# Patient Record
Sex: FEMALE | Race: WHITE | HISPANIC OR LATINO | Employment: FULL TIME | URBAN - METROPOLITAN AREA
[De-identification: names, ages, dates, MRNs, and addresses within clinical notes are randomized per-mention and may not be internally consistent; named-entity substitution may affect disease eponyms.]

---

## 2017-04-18 ENCOUNTER — ALLSCRIPTS OFFICE VISIT (OUTPATIENT)
Dept: OTHER | Facility: OTHER | Age: 32
End: 2017-04-18

## 2017-09-26 ENCOUNTER — APPOINTMENT (OUTPATIENT)
Dept: LAB | Facility: HOSPITAL | Age: 32
End: 2017-09-26
Payer: COMMERCIAL

## 2017-09-26 ENCOUNTER — ALLSCRIPTS OFFICE VISIT (OUTPATIENT)
Dept: OTHER | Facility: OTHER | Age: 32
End: 2017-09-26

## 2017-09-26 ENCOUNTER — GENERIC CONVERSION - ENCOUNTER (OUTPATIENT)
Dept: OTHER | Facility: OTHER | Age: 32
End: 2017-09-26

## 2017-09-26 ENCOUNTER — TRANSCRIBE ORDERS (OUTPATIENT)
Dept: LAB | Facility: HOSPITAL | Age: 32
End: 2017-09-26

## 2017-09-26 DIAGNOSIS — Z34.91 ENCOUNTER FOR SUPERVISION OF NORMAL PREGNANCY IN FIRST TRIMESTER: ICD-10-CM

## 2017-09-26 LAB
ABO GROUP BLD: NORMAL
BASOPHILS # BLD AUTO: 0.01 THOUSANDS/ΜL (ref 0–0.1)
BASOPHILS NFR BLD AUTO: 0 % (ref 0–1)
BILIRUB UR QL STRIP: NEGATIVE
BLD GP AB SCN SERPL QL: NEGATIVE
CLARITY UR: CLEAR
COLOR UR: YELLOW
EOSINOPHIL # BLD AUTO: 0.28 THOUSAND/ΜL (ref 0–0.61)
EOSINOPHIL NFR BLD AUTO: 4 % (ref 0–6)
ERYTHROCYTE [DISTWIDTH] IN BLOOD BY AUTOMATED COUNT: 13.2 % (ref 11.6–15.1)
GLUCOSE UR STRIP-MCNC: NEGATIVE MG/DL
HBV SURFACE AG SER QL: NORMAL
HCT VFR BLD AUTO: 34.9 % (ref 34.8–46.1)
HGB BLD-MCNC: 11.9 G/DL (ref 11.5–15.4)
HGB UR QL STRIP.AUTO: NEGATIVE
KETONES UR STRIP-MCNC: NEGATIVE MG/DL
LEUKOCYTE ESTERASE UR QL STRIP: NEGATIVE
LYMPHOCYTES # BLD AUTO: 2.09 THOUSANDS/ΜL (ref 0.6–4.47)
LYMPHOCYTES NFR BLD AUTO: 30 % (ref 14–44)
MCH RBC QN AUTO: 29.1 PG (ref 26.8–34.3)
MCHC RBC AUTO-ENTMCNC: 34.1 G/DL (ref 31.4–37.4)
MCV RBC AUTO: 85 FL (ref 82–98)
MONOCYTES # BLD AUTO: 0.55 THOUSAND/ΜL (ref 0.17–1.22)
MONOCYTES NFR BLD AUTO: 8 % (ref 4–12)
NEUTROPHILS # BLD AUTO: 3.98 THOUSANDS/ΜL (ref 1.85–7.62)
NEUTS SEG NFR BLD AUTO: 58 % (ref 43–75)
NITRITE UR QL STRIP: NEGATIVE
NRBC BLD AUTO-RTO: 0 /100 WBCS
PH UR STRIP.AUTO: 6 [PH] (ref 4.5–8)
PLATELET # BLD AUTO: 196 THOUSANDS/UL (ref 149–390)
PMV BLD AUTO: 11.5 FL (ref 8.9–12.7)
PROT UR STRIP-MCNC: NEGATIVE MG/DL
RBC # BLD AUTO: 4.09 MILLION/UL (ref 3.81–5.12)
RH BLD: NEGATIVE
RUBV IGG SERPL IA-ACNC: 41.5 IU/ML
SP GR UR STRIP.AUTO: 1.01 (ref 1–1.03)
SPECIMEN EXPIRATION DATE: NORMAL
UROBILINOGEN UR QL STRIP.AUTO: 1 E.U./DL
WBC # BLD AUTO: 6.93 THOUSAND/UL (ref 4.31–10.16)

## 2017-09-26 PROCEDURE — 87086 URINE CULTURE/COLONY COUNT: CPT

## 2017-09-26 PROCEDURE — 80081 OBSTETRIC PANEL INC HIV TSTG: CPT

## 2017-09-26 PROCEDURE — 81003 URINALYSIS AUTO W/O SCOPE: CPT

## 2017-09-26 PROCEDURE — 36415 COLL VENOUS BLD VENIPUNCTURE: CPT

## 2017-09-27 LAB
BACTERIA UR CULT: NORMAL
HIV 1+2 AB+HIV1 P24 AG SERPL QL IA: NORMAL
RPR SER QL: NORMAL

## 2017-10-04 ENCOUNTER — LAB REQUISITION (OUTPATIENT)
Dept: LAB | Facility: HOSPITAL | Age: 32
End: 2017-10-04
Payer: COMMERCIAL

## 2017-10-04 ENCOUNTER — GENERIC CONVERSION - ENCOUNTER (OUTPATIENT)
Dept: OTHER | Facility: OTHER | Age: 32
End: 2017-10-04

## 2017-10-04 ENCOUNTER — ALLSCRIPTS OFFICE VISIT (OUTPATIENT)
Dept: OTHER | Facility: OTHER | Age: 32
End: 2017-10-04

## 2017-10-04 DIAGNOSIS — Z01.419 ENCOUNTER FOR GYNECOLOGICAL EXAMINATION WITHOUT ABNORMAL FINDING: ICD-10-CM

## 2017-10-04 DIAGNOSIS — Z11.3 ENCOUNTER FOR SCREENING FOR INFECTIONS WITH PREDOMINANTLY SEXUAL MODE OF TRANSMISSION: ICD-10-CM

## 2017-10-04 PROCEDURE — G0145 SCR C/V CYTO,THINLAYER,RESCR: HCPCS | Performed by: OBSTETRICS & GYNECOLOGY

## 2017-10-04 PROCEDURE — 87591 N.GONORRHOEAE DNA AMP PROB: CPT | Performed by: OBSTETRICS & GYNECOLOGY

## 2017-10-04 PROCEDURE — 87491 CHLMYD TRACH DNA AMP PROBE: CPT | Performed by: OBSTETRICS & GYNECOLOGY

## 2017-10-04 PROCEDURE — 87624 HPV HI-RISK TYP POOLED RSLT: CPT | Performed by: OBSTETRICS & GYNECOLOGY

## 2017-10-06 LAB
CHLAMYDIA DNA CVX QL NAA+PROBE: NORMAL
HPV RRNA GENITAL QL NAA+PROBE: NORMAL
N GONORRHOEA DNA GENITAL QL NAA+PROBE: NORMAL

## 2017-10-18 LAB
LAB AP GYN PRIMARY INTERPRETATION: NORMAL
Lab: NORMAL

## 2017-11-01 ENCOUNTER — GENERIC CONVERSION - ENCOUNTER (OUTPATIENT)
Dept: OTHER | Facility: OTHER | Age: 32
End: 2017-11-01

## 2017-11-01 ENCOUNTER — TRANSCRIBE ORDERS (OUTPATIENT)
Dept: LAB | Facility: HOSPITAL | Age: 32
End: 2017-11-01

## 2017-11-01 DIAGNOSIS — Z34.91 ENCOUNTER FOR SUPERVISION OF NORMAL PREGNANCY IN FIRST TRIMESTER, UNSPECIFIED GRAVIDITY: Primary | ICD-10-CM

## 2017-11-03 DIAGNOSIS — Z34.91 ENCOUNTER FOR SUPERVISION OF NORMAL PREGNANCY IN FIRST TRIMESTER: ICD-10-CM

## 2017-11-20 ENCOUNTER — APPOINTMENT (OUTPATIENT)
Dept: LAB | Facility: HOSPITAL | Age: 32
End: 2017-11-20
Payer: COMMERCIAL

## 2017-11-20 DIAGNOSIS — Z34.91 ENCOUNTER FOR SUPERVISION OF NORMAL PREGNANCY IN FIRST TRIMESTER, UNSPECIFIED GRAVIDITY: ICD-10-CM

## 2017-11-20 PROCEDURE — 82105 ALPHA-FETOPROTEIN SERUM: CPT

## 2017-11-20 PROCEDURE — 36415 COLL VENOUS BLD VENIPUNCTURE: CPT

## 2017-11-20 PROCEDURE — 84702 CHORIONIC GONADOTROPIN TEST: CPT

## 2017-11-20 PROCEDURE — 82677 ASSAY OF ESTRIOL: CPT

## 2017-11-20 PROCEDURE — 86336 INHIBIN A: CPT

## 2017-11-27 ENCOUNTER — GENERIC CONVERSION - ENCOUNTER (OUTPATIENT)
Dept: OTHER | Facility: OTHER | Age: 32
End: 2017-11-27

## 2017-11-27 ENCOUNTER — ALLSCRIPTS OFFICE VISIT (OUTPATIENT)
Dept: OTHER | Facility: OTHER | Age: 32
End: 2017-11-27

## 2017-11-27 LAB
GLUCOSE (HISTORICAL): NORMAL
PROT UR STRIP-MCNC: NORMAL MG/DL

## 2017-12-13 ENCOUNTER — GENERIC CONVERSION - ENCOUNTER (OUTPATIENT)
Dept: OTHER | Facility: OTHER | Age: 32
End: 2017-12-13

## 2017-12-13 ENCOUNTER — ALLSCRIPTS OFFICE VISIT (OUTPATIENT)
Dept: PERINATAL CARE | Facility: CLINIC | Age: 32
End: 2017-12-13
Payer: COMMERCIAL

## 2017-12-13 PROCEDURE — 76811 OB US DETAILED SNGL FETUS: CPT | Performed by: OBSTETRICS & GYNECOLOGY

## 2017-12-13 PROCEDURE — 76817 TRANSVAGINAL US OBSTETRIC: CPT | Performed by: OBSTETRICS & GYNECOLOGY

## 2017-12-29 ENCOUNTER — GENERIC CONVERSION - ENCOUNTER (OUTPATIENT)
Dept: OTHER | Facility: OTHER | Age: 32
End: 2017-12-29

## 2017-12-29 DIAGNOSIS — Z34.92 ENCOUNTER FOR SUPERVISION OF NORMAL PREGNANCY IN SECOND TRIMESTER: ICD-10-CM

## 2018-01-14 VITALS
WEIGHT: 137 LBS | SYSTOLIC BLOOD PRESSURE: 121 MMHG | BODY MASS INDEX: 23.39 KG/M2 | HEIGHT: 64 IN | DIASTOLIC BLOOD PRESSURE: 85 MMHG

## 2018-01-16 ENCOUNTER — TRANSCRIBE ORDERS (OUTPATIENT)
Dept: LAB | Facility: HOSPITAL | Age: 33
End: 2018-01-16

## 2018-01-16 ENCOUNTER — APPOINTMENT (OUTPATIENT)
Dept: LAB | Facility: HOSPITAL | Age: 33
End: 2018-01-16
Payer: COMMERCIAL

## 2018-01-16 DIAGNOSIS — Z34.92 ENCOUNTER FOR SUPERVISION OF NORMAL PREGNANCY IN SECOND TRIMESTER: ICD-10-CM

## 2018-01-16 LAB
ABO GROUP BLD: NORMAL
BLD GP AB SCN SERPL QL: NEGATIVE
ERYTHROCYTE [DISTWIDTH] IN BLOOD BY AUTOMATED COUNT: 13.1 % (ref 11.6–15.1)
GLUCOSE 1H P 50 G GLC PO SERPL-MCNC: 78 MG/DL
HCT VFR BLD AUTO: 33.1 % (ref 34.8–46.1)
HGB BLD-MCNC: 11.4 G/DL (ref 11.5–15.4)
MCH RBC QN AUTO: 30 PG (ref 26.8–34.3)
MCHC RBC AUTO-ENTMCNC: 34.4 G/DL (ref 31.4–37.4)
MCV RBC AUTO: 87 FL (ref 82–98)
PLATELET # BLD AUTO: 201 THOUSANDS/UL (ref 149–390)
PMV BLD AUTO: 10.9 FL (ref 8.9–12.7)
RBC # BLD AUTO: 3.8 MILLION/UL (ref 3.81–5.12)
RH BLD: NEGATIVE
RPR SER QL: NORMAL
SPECIMEN EXPIRATION DATE: NORMAL
WBC # BLD AUTO: 10.69 THOUSAND/UL (ref 4.31–10.16)

## 2018-01-16 PROCEDURE — 86850 RBC ANTIBODY SCREEN: CPT

## 2018-01-16 PROCEDURE — 86901 BLOOD TYPING SEROLOGIC RH(D): CPT

## 2018-01-16 PROCEDURE — 85027 COMPLETE CBC AUTOMATED: CPT

## 2018-01-16 PROCEDURE — 86592 SYPHILIS TEST NON-TREP QUAL: CPT

## 2018-01-16 PROCEDURE — 86900 BLOOD TYPING SEROLOGIC ABO: CPT

## 2018-01-16 PROCEDURE — 36415 COLL VENOUS BLD VENIPUNCTURE: CPT

## 2018-01-16 PROCEDURE — 82950 GLUCOSE TEST: CPT

## 2018-01-22 VITALS
HEIGHT: 64 IN | WEIGHT: 141 LBS | DIASTOLIC BLOOD PRESSURE: 77 MMHG | BODY MASS INDEX: 24.07 KG/M2 | SYSTOLIC BLOOD PRESSURE: 124 MMHG

## 2018-01-22 VITALS
BODY MASS INDEX: 24.41 KG/M2 | HEIGHT: 64 IN | SYSTOLIC BLOOD PRESSURE: 128 MMHG | DIASTOLIC BLOOD PRESSURE: 74 MMHG | WEIGHT: 143 LBS

## 2018-01-22 VITALS
WEIGHT: 142 LBS | HEIGHT: 64 IN | BODY MASS INDEX: 24.24 KG/M2 | SYSTOLIC BLOOD PRESSURE: 126 MMHG | DIASTOLIC BLOOD PRESSURE: 75 MMHG

## 2018-01-22 VITALS
SYSTOLIC BLOOD PRESSURE: 110 MMHG | DIASTOLIC BLOOD PRESSURE: 70 MMHG | BODY MASS INDEX: 24.89 KG/M2 | WEIGHT: 145 LBS | RESPIRATION RATE: 81 BRPM

## 2018-01-23 NOTE — MISCELLANEOUS
Reason For Visit  Reason For Visit Free Text Note Form: PN PATIENT SEEN FOR ASSESS  Case Management Documentation [facility]:   Information obtained from the patient and medical record  Patient's financial status employed  She is also dealing with additional issues such as language/communication barrier  Patient is participating in Sophie Cabrera  Action Plan: information provided  Progress Note  SW MET WITH 31 Y/O- S- G3:P2-  Telugu SPEAKING WOMAN  PATIENT RESIDES WITH FOB AND 2 KIDS  REPORTED PREGNANCY WAS PLAN  PATIENT DENIES ANY USAGE OF DRUG,ALCOHOL OR SMOKING  NO MENTAL HEALTH OR DOMESTIC VIOLENCE ISSUES  PATIENT NEED TO CALL WIC FOR APPOINTMENT  HAS COMMERCIAL INSURANCE  NO CONCERN AT THIS TIME  WILL CONTACT SW AS NEEDED  Active Problems    1  Prenatal care in second trimester (V22 1) (Z34 92)   2  Varicose veins of legs (454 9) (I69 93)    Current Meds   1  Iron TABS; Therapy: (Recorded:2017) to Recorded   2  Prenatal Plus 27-1 MG Oral Tablet; TAKE 1 TABLET DAILY; Therapy: [id number] to (Evaluate:2018) Recorded    Allergies    1  No Known Drug Allergies    2   Seasonal    Future Appointments    Date/Time Provider Specialty Site   2018 10:00 AM  Rakesh, Srini  [facility]     Signatures   Electronically signed by : JUDAH Leonard; Dec 29 2017 11:09AM EST                       (Author)

## 2018-01-23 NOTE — PROGRESS NOTES
DEC 13 2017         RE: Luna Ramires                                 To: [address]   MR#: [id number]                                   [facility]   : [address] [facility], [address]   ENC: [id number]                             Fax: [phone]   (Exam #: [id number])      The LMP of this 28year old,  G3, P2-0-0-2 patient was 2017, her   working TANVI is 2018 and the current gestational age is 21 weeks 5   days by [address] [place] [address]  A sonographic examination was performed on DEC   13 2017 using real time equipment  The ultrasound examination was   performed using abdominal & vaginal techniques  The patient has a BMI of   25 9  Her blood pressure today was 116/64  Earliest US on record: 10/4/17 11w5d 18       TANVI      Cardiac motion was observed at 148 bpm       INDICATIONS      fetal anatomical survey   rh negative      Exam Types      Transvaginal   LEVEL II      RESULTS      Fetus # 1 of 1   Breech presentation   Fetal growth appeared normal   Placenta Location = Anterior   No placenta previa   Placenta Grade = I      MEASUREMENTS (* Included In Average GA)      AC              16 4 cm        21 weeks 1 day * (35%)   BPD              5 2 cm        21 weeks 6 days* (49%)   HC              18 9 cm        21 weeks 1 day * (25%)   Femur            3 5 cm        21 weeks 1 day * (27%)      Nuchal Fold      3 5 mm   NBL              6 8 mm      Humerus          3 5 cm        22 weeks 0 days  (56%)      Cerebellum       2 2 cm        21 weeks 1 day   Biorbit          3 4 cm        21 weeks 6 days   CisternaMagna    4 8 mm      HC/AC           1 15   FL/AC           0 21   FL/BPD          0 67   EFW (Ac/Fl/Hc)   408 grams - 0 lbs 14 oz      THE AVERAGE GESTATIONAL AGE is 21 weeks 2 days +/- 10 days        AMNIOTIC FLUID         Largest Vertical Pocket = 4 9 cm   Amniotic Fluid: Normal      CERVICAL EVALUATION SUPINE      Cervical Length: 3 60 cm      OTHER TEST RESULTS           Funneling?: No             Dynamic Changes?: No        Resp  To TFP?: No      ANATOMY      Head                                    Normal   Face/Neck                               Normal   Th  Cav  Normal   Heart                                   See Details   Abd  Cav  Normal   Stomach                                 Normal   Right Kidney                            Normal   Left Kidney                             Normal   Bladder                                 Normal   Abd  Wall                               Normal   Spine                                   Normal   Extrems                                 Normal   Genitalia                               Normal   Placenta                                Normal   Umbl  Cord                              Normal   Uterus                                  Normal   PCI                                     Normal      ANATOMY DETAILS      Visualized Appearing Sonographically Normal:   HEAD: (Calvarium, BPD Level, Cavum, Lateral Ventricles, Choroid Plexus,   Cerebellum, Cisterna Magna);    FACE/NECK: (Neck, Nuchal Fold, Profile,   Orbits, Nose/Lips, Palate, Face);    TH  CAV  : (Lungs, Diaphragm); HEART: (Four Chamber View, Proximal Left Outflow, Proximal Right Outflow,   3VV, 3 Vessel Trachea, Short Axis of Greater Vessels, Ductal Arch,   Interventricular Septum, Interatrial Septum, Cardiac Axis, Cardiac   Position);    ABD  CAV : (Liver);    STOMACH, RIGHT KIDNEY, LEFT KIDNEY,   BLADDER, ABD  WALL, SPINE: (Cervical Spine, Thoracic Spine, Lumbar Spine,   Sacrum);    EXTREMS: (Lt Humerus, Rt Humerus, Lt Forearm, Rt Forearm, Lt   Hand, Rt Hand, Lt Femur, Rt Femur, Lt Low Leg, Rt Low Leg, Lt Foot, Rt   Foot);    GENITALIA, PLACENTA, UMBL   CORD, UTERUS, PCI      Not Visualized:   HEART: (Aortic Arch, IVC, SVC)      ADNEXA      The left ovary appeared normal and measured 2 7 x 1 9 x 1 8 cm with a   volume of 4 8 cc  The right ovary appeared normal and measured 3 0 x 2 8 x   1 8 cm with a volume of 7 9 cc  IMPRESSION      Valdez IUP   21 weeks and 2 days by this ultrasound  (TANVI=2018)   21 weeks and 5 days by 1st Tri Sono  (TANVI=2018)   Breech presentation   Fetal growth appeared normal   Regular fetal heart rate of 148 bpm   Anterior placenta   No placenta previa      GENERAL COMMENT      Ms Conner Adair is here for anatomy survey and cervical length screening  She   has a prior  section followed by successful   Quad Screen   results are interpreted as Screen Negative based on risk of Down syndrome   of 1:10,000, risk of trisomy 18 of 1:1,1730, and risk of open neural tube   defect of 1:2155  There is a single live intrauterine pregnancy with size equivalent to   dates  No gross anomalies were identified however the anatomy survey is   incomplete  Amniotic fluid is within normal limits  Transvaginal cervical length measures 36mm indicating low risk for   spontaneous  birth  Evaluation and Management:   The patient was counseled regarding the above findings  A total of 10   minutes were spent in this encounter with >50% of the time spent in   face-to-face counseling and in coordination of care  The limitations of   ultrasound were reviewed  We discussed that while the presence of a normal appearing ultrasound is   reassuring, it does not completely preclude the presence of aneuploidy or   malformation  Quad screen results were reviewed with her in detail  She should return in 8 weeks for followup anatomy survey and growth  We discussed her history of prior  section    We reviewed her   chance of a successful TOLAC (trial of labor after  section) which   per the Jose Corbin 12 (available at [email or web address])   is estimated at 90 2% based on today's BMI factors  We discussed that the   best chance for successful TOLAC is in the setting of spontaneous labor   prior to 40 weeks  I gave her a Patient Education handout from [employer] on    (Vaginal Birth After , in [place]  She also desires   sterilization  At the conclusion of today's encounter, all questions were answered to her   satisfaction  Thank you very much for this kind referral and please do   not hesitate to contact me with any further questions or concerns  [employer], JANINE Zhou     Maternal Fetal Medicine   Electronically signed 17 12:59

## 2018-01-24 ENCOUNTER — OFFICE VISIT (OUTPATIENT)
Dept: OBGYN CLINIC | Facility: HOSPITAL | Age: 33
End: 2018-01-24

## 2018-01-24 ENCOUNTER — OB ABSTRACT (OUTPATIENT)
Dept: OBGYN CLINIC | Facility: HOSPITAL | Age: 33
End: 2018-01-24

## 2018-01-24 VITALS
SYSTOLIC BLOOD PRESSURE: 113 MMHG | BODY MASS INDEX: 27.12 KG/M2 | HEART RATE: 79 BPM | WEIGHT: 158 LBS | DIASTOLIC BLOOD PRESSURE: 71 MMHG

## 2018-01-24 VITALS
BODY MASS INDEX: 25.78 KG/M2 | HEIGHT: 64 IN | DIASTOLIC BLOOD PRESSURE: 64 MMHG | SYSTOLIC BLOOD PRESSURE: 116 MMHG | WEIGHT: 151 LBS

## 2018-01-24 VITALS
SYSTOLIC BLOOD PRESSURE: 114 MMHG | HEIGHT: 64 IN | DIASTOLIC BLOOD PRESSURE: 59 MMHG | BODY MASS INDEX: 25.95 KG/M2 | WEIGHT: 152 LBS | HEART RATE: 77 BPM

## 2018-01-24 DIAGNOSIS — Z3A.27 27 WEEKS GESTATION OF PREGNANCY: ICD-10-CM

## 2018-01-24 DIAGNOSIS — O26.892 RH NEGATIVE STATE IN ANTEPARTUM PERIOD, SECOND TRIMESTER: Primary | ICD-10-CM

## 2018-01-24 DIAGNOSIS — Z67.91 RH NEGATIVE STATE IN ANTEPARTUM PERIOD, SECOND TRIMESTER: Primary | ICD-10-CM

## 2018-01-24 PROBLEM — Z34.92 PRENATAL CARE IN SECOND TRIMESTER: Status: ACTIVE | Noted: 2018-01-24

## 2018-01-24 LAB — EXTERNAL RHOGAM GIVEN?: NORMAL

## 2018-01-24 PROCEDURE — PNV: Performed by: NURSE PRACTITIONER

## 2018-01-24 RX ORDER — PNV NO.95/FERROUS FUM/FOLIC AC 28MG-0.8MG
TABLET ORAL
COMMUNITY
End: 2018-04-26 | Stop reason: HOSPADM

## 2018-01-24 NOTE — PROGRESS NOTES
Denies loss of fluid, vaginal bleeding and contractions  Confirms frequent fetal movement  Tolerating iron and prenatal vitamin well  Twenty-eight week labs reviewed and within normal limits  Due for RhoGAM today  Blood type is O-     plan:  1  Continue prenatal vitamin daily  2    RhoGAM today  3    Precautions reviewed    RTO 2 weeks

## 2018-01-24 NOTE — PATIENT INSTRUCTIONS
Labor   AMBULATORY CARE:    labor  labor occurs when the uterus contracts and your cervix opens earlier than normal  The cervix is the opening of your uterus  In  labor, contractions are strong enough and occur often enough to allow the cervix to open for delivery of your baby   labor happens after the 20th week of pregnancy but before the 37th week of pregnancy  An early labor could cause you to have your baby before he is ready to be born  Common signs and symptoms include the following: You may not know that you are having  labor  It is common to have  contractions (tightening and relaxing of the uterus) and not notice them  The following are signs and symptoms that suggest a  labor:  · Contractions that get stronger and closer together    · Changes in vaginal discharge, such as more discharge or discharge that is watery or bloody     · Low back pain     · Pressure in the lower abdomen     · Vaginal spotting or bleeding  Call 911 for any of the following:   · You see or feel like there is something in your vagina  Seek care immediately if:   · You have bright red, painless vaginal bleeding  · Your symptoms do not get better or they get worse  · Your water broke or you feel warm water gushing or trickling from your vagina  · You have contractions that get stronger and closer together for more than 1 hour  Contact your healthcare provider if:   · You notice a decrease in your baby's movement  · You have abdominal cramps, pressure, or tightening  · You have a change in vaginal discharge  · You have a fever  · You have burning when you urinate or you are urinating less than is normal for you  · You have questions or concerns about your condition or care  Treatment for  labor  may delay delivery  You may need any of the following:  · Bed rest  may be recommended   You may need to lie on your left side, which improves circulation to the uterus and baby  Your healthcare provider will tell you when it is okay to get out of bed  · Medicine  may be given to stop contractions if your baby is not ready to be born  You may also need certain medicines if your  labor cannot be stopped and your healthcare provider thinks you will have your baby early  These medicines help your baby's lungs, brain, and digestive organs mature  They also help decrease your baby's risk of being born with cerebral palsy  Antibiotics may be given to treat a bacterial infection, if needed  Self-care:   · Rest  as much as possible  You may need to lie on your left side to improve circulation to the uterus and baby  You may be able to prevent  labor by resting and reducing your physical activity  · Ask your healthcare provider about activities that are safe for you to do  Your healthcare provider or obstetrician may recommend that you avoid sexual intercourse  Ask your healthcare provider if exercise is safe  · Drink liquids as directed  Ask how much liquid to drink each day and which liquids are best for you  · Do not smoke  Your baby may not grow well and he may weigh less at birth if you smoke during pregnancy  Smoking also increases the risk that your baby will be born too early  Nicotine and other chemicals in cigarettes and cigars can cause lung damage  Ask your healthcare provider for information if you currently smoke and need help to quit  E-cigarettes or smokeless tobacco still contain nicotine  Talk to your healthcare provider before you use these products  · Do not drink alcohol  Alcohol may harm your unborn baby and cause  labor  · Maintain a healthy weight  A healthy weight may prevent  labor  Ask your healthcare provider how much weight you should gain during your pregnancy  Follow up with your healthcare provider as directed:  Write down your questions so you remember to ask them during your visits     © 2017 [facility] Information is for End User's use only and may not be sold, redistributed or otherwise used for commercial purposes  All illustrations and images included in CareNotes® are the copyrighted property of A D A M , Inc  or [address]  The above information is an  only  It is not intended as medical advice for individual conditions or treatments  Talk to your doctor, nurse or pharmacist before following any medical regimen to see if it is safe and effective for you

## 2018-02-09 ENCOUNTER — ULTRASOUND (OUTPATIENT)
Dept: PERINATAL CARE | Facility: CLINIC | Age: 33
End: 2018-02-09
Payer: COMMERCIAL

## 2018-02-09 VITALS
WEIGHT: 157.6 LBS | DIASTOLIC BLOOD PRESSURE: 63 MMHG | HEART RATE: 76 BPM | SYSTOLIC BLOOD PRESSURE: 107 MMHG | HEIGHT: 64 IN | BODY MASS INDEX: 26.91 KG/M2

## 2018-02-09 DIAGNOSIS — O34.219 MATERNAL CARE FOR SCAR FROM PREVIOUS CESAREAN DELIVERY, UNSPECIFIED PRIOR CESAREAN DELIVERY TYPE: ICD-10-CM

## 2018-02-09 DIAGNOSIS — Z36.88 ENCOUNTER FOR ANTENATAL SCREENING FOR FETAL MACROSOMIA: ICD-10-CM

## 2018-02-09 DIAGNOSIS — IMO0002 EVALUATE ANATOMY NOT SEEN ON PRIOR SONOGRAM: Primary | ICD-10-CM

## 2018-02-09 PROCEDURE — 76816 OB US FOLLOW-UP PER FETUS: CPT | Performed by: OBSTETRICS & GYNECOLOGY

## 2018-02-09 NOTE — PROGRESS NOTES
Maternal-Fetal Medicine: Ms Abby Levin was seen today in the [address] today for followup missed anatomy ultrasound  Please see ultrasound report under "OB Procedures" tab in EPIC    Thank you for the referral and please don't hesitate to contact our office with any concerns or questions      Sincerely,  Ronell Seip, MD  Attending Physician, Maternal-Fetal Medicine

## 2018-02-19 ENCOUNTER — ROUTINE PRENATAL (OUTPATIENT)
Dept: OBGYN CLINIC | Facility: HOSPITAL | Age: 33
End: 2018-02-19

## 2018-02-19 VITALS — SYSTOLIC BLOOD PRESSURE: 108 MMHG | DIASTOLIC BLOOD PRESSURE: 70 MMHG | BODY MASS INDEX: 27.57 KG/M2 | WEIGHT: 160.6 LBS

## 2018-02-19 DIAGNOSIS — I83.93 VARICOSE VEINS OF LEGS: ICD-10-CM

## 2018-02-19 DIAGNOSIS — Z3A.31 31 WEEKS GESTATION OF PREGNANCY: Primary | ICD-10-CM

## 2018-02-19 DIAGNOSIS — Z23 NEED FOR IMMUNIZATION AGAINST PERTUSSIS: ICD-10-CM

## 2018-02-19 PROBLEM — Z98.891 HISTORY OF LOW TRANSVERSE CESAREAN SECTION: Status: ACTIVE | Noted: 2018-02-19

## 2018-02-19 PROBLEM — Z34.93 PRENATAL CARE IN THIRD TRIMESTER: Status: ACTIVE | Noted: 2018-01-24

## 2018-02-19 PROCEDURE — PNV: Performed by: OBSTETRICS & GYNECOLOGY

## 2018-02-19 NOTE — PROGRESS NOTES
27 yo  at 31w3d  Patient doing well- has complaints of worsening varicosities in her legs that have now presented on her labia  On exam both legs show moderate varicosities to level upper thigh, soft, non-tender, no edeme, calves equal in size bilaterally, negative homans sign  No SOB, CP or palpitations  Patient states she works long hours on her legs at [employer]  She denies any CTX, Vb, LOF and reports good fetal movement      A/P:  27 yo  at [address] for routine prenatal visit  - Check in card given today   - Please give Tdap at next visit- left before receiving  - Discussed  labor precautions, kick counts and how to get a holdof OB on call  - RTC in 2 weeks    LE varicosities  - Discussed use of compression socks and taking frequent breaks at work to sit  - Work letter for clinic given    H/o CS x1 followed by successful   - Desires vaginal delivery    Rh negative status   - s/p Rhogam at 28 weeks    D/w Dr Araceli Aguillon

## 2018-03-07 NOTE — PROGRESS NOTES
Education  Baby & Me Education 1st Trimester:   First Trimester Education provided:   benefits of breastfeeding, importance of exclusive breastfeeding, early initiation of breastfeeding and exclusive breastfeeding for the first 6 months   The patient is planning on breastfeeding     Prenatal education provided by: Wright Schilder RN      Signatures   Electronically signed by : Wright Schilder, ; Sep 26 2017  1:22PM EST                       (Author)

## 2018-03-23 ENCOUNTER — ROUTINE PRENATAL (OUTPATIENT)
Dept: OBGYN CLINIC | Facility: HOSPITAL | Age: 33
End: 2018-03-23
Payer: COMMERCIAL

## 2018-03-23 VITALS
HEIGHT: 64 IN | WEIGHT: 167 LBS | HEART RATE: 82 BPM | BODY MASS INDEX: 28.51 KG/M2 | DIASTOLIC BLOOD PRESSURE: 77 MMHG | SYSTOLIC BLOOD PRESSURE: 120 MMHG

## 2018-03-23 DIAGNOSIS — Z23 NEED FOR DTAP VACCINE: Primary | ICD-10-CM

## 2018-03-23 DIAGNOSIS — Z67.91 RH NEGATIVE STATE IN ANTEPARTUM PERIOD, THIRD TRIMESTER: ICD-10-CM

## 2018-03-23 DIAGNOSIS — Z34.93 PRENATAL CARE IN THIRD TRIMESTER: ICD-10-CM

## 2018-03-23 DIAGNOSIS — Z23 NEED FOR TDAP VACCINATION: ICD-10-CM

## 2018-03-23 DIAGNOSIS — Z98.891 HISTORY OF LOW TRANSVERSE CESAREAN SECTION: ICD-10-CM

## 2018-03-23 DIAGNOSIS — O26.893 RH NEGATIVE STATE IN ANTEPARTUM PERIOD, THIRD TRIMESTER: ICD-10-CM

## 2018-03-23 DIAGNOSIS — Z3A.36 36 WEEKS GESTATION OF PREGNANCY: ICD-10-CM

## 2018-03-23 PROCEDURE — 90471 IMMUNIZATION ADMIN: CPT | Performed by: OBSTETRICS & GYNECOLOGY

## 2018-03-23 PROCEDURE — PNV: Performed by: OBSTETRICS & GYNECOLOGY

## 2018-03-23 PROCEDURE — 90715 TDAP VACCINE 7 YRS/> IM: CPT | Performed by: OBSTETRICS & GYNECOLOGY

## 2018-03-23 PROCEDURE — 87653 STREP B DNA AMP PROBE: CPT | Performed by: OBSTETRICS & GYNECOLOGY

## 2018-03-23 NOTE — ASSESSMENT & PLAN NOTE
Continue to elevate legs and utilize compression socks  May apply ice packs to vaginal area to relieve pain and discomfort

## 2018-03-23 NOTE — PROGRESS NOTES
33yo [id number] at 36wks gestation here for prenatal visit  She has no complaints today  She denies vaginal bleeding, LOF, and vaginal discharge  She reports good fetal movement  She is still having discomfort with her varicose veins but reports relief with rest and elevating her legs  Problem List Items Addressed This Visit        Other    Prenatal care in third trimester    36 weeks gestation of pregnancy     GBS collected  No penicillin allergy  Will follow up results  Patient to RTC for prenatal visit at 37wks  Relevant Orders    Strep B DNA probe, amplification    History of low transverse  section     Discussed with patient risks of TOLAC including risk of uterine rupture of 1% and an increase of that risk to 3-5% if induction medications utilized such as pitocin or misoprostol  Patient has history of successful   Informed patient of success rate of 89 1% based on [facility]  success calculator  Patient expresses understanding of all risks discussed  She continues to desire   Rh negative state in antepartum period, third trimester      Other Visit Diagnoses     Need for DTaP vaccine    -  Primary    Need for Tdap vaccination        Relevant Orders    TDAP Vaccine greater than or equal to 8yo (Completed)        Luisa Evangelista MD  OBGYN, PGY3  3/23/2018 10:34 AM

## 2018-03-23 NOTE — ASSESSMENT & PLAN NOTE
Discussed with patient risks of TOLAC including risk of uterine rupture of 1% and an increase of that risk to 3-5% if induction medications utilized such as pitocin or misoprostol  Patient has history of successful   Informed patient of success rate of 89 1% based on [facility]  success calculator  Patient expresses understanding of all risks discussed  She continues to desire

## 2018-03-23 NOTE — ASSESSMENT & PLAN NOTE
GBS collected  No penicillin allergy  Will follow up results  Patient to RTC for prenatal visit at 37wks

## 2018-03-25 LAB — GP B STREP DNA SPEC QL NAA+PROBE: NORMAL

## 2018-04-02 ENCOUNTER — ROUTINE PRENATAL (OUTPATIENT)
Dept: OBGYN CLINIC | Facility: HOSPITAL | Age: 33
End: 2018-04-02

## 2018-04-02 VITALS
SYSTOLIC BLOOD PRESSURE: 123 MMHG | WEIGHT: 168 LBS | DIASTOLIC BLOOD PRESSURE: 81 MMHG | HEART RATE: 94 BPM | BODY MASS INDEX: 28.68 KG/M2 | HEIGHT: 64 IN

## 2018-04-02 DIAGNOSIS — Z67.91 RH NEGATIVE STATE IN ANTEPARTUM PERIOD, THIRD TRIMESTER: ICD-10-CM

## 2018-04-02 DIAGNOSIS — Z34.93 PRENATAL CARE IN THIRD TRIMESTER: ICD-10-CM

## 2018-04-02 DIAGNOSIS — Z98.891 HISTORY OF LOW TRANSVERSE CESAREAN SECTION: ICD-10-CM

## 2018-04-02 DIAGNOSIS — Z3A.37 37 WEEKS GESTATION OF PREGNANCY: ICD-10-CM

## 2018-04-02 DIAGNOSIS — O26.893 RH NEGATIVE STATE IN ANTEPARTUM PERIOD, THIRD TRIMESTER: ICD-10-CM

## 2018-04-02 PROCEDURE — PNV: Performed by: OBSTETRICS & GYNECOLOGY

## 2018-04-02 NOTE — PROGRESS NOTES
Assessment & Plan  28 y o  [id number] at 37w3d presenting for routine prenatal visit  1  Continue routine prenatal care  2  GBS negative  Patient is aware  3  Patient still desires to [facility]  She has a history of one successful   4  FMLA paperwork to be filled out for next visit  5  Labor precaution and fetal kick count reviweed  6  RTO in one week    Problem List Items Addressed This Visit        Other    Prenatal care in third trimester    37 weeks gestation of pregnancy    History of low transverse  section    Rh negative state in antepartum period, third trimester        ____________________________________________________________  Subjective  She is without complaint  She denies contractions, loss of fluid, or vaginal bleeding  She feels regular fetal movements       Objective  /81 (BP Location: Left arm)   Pulse 94   Ht 5' 4" (1 626 m)   Wt 76 2 kg (168 lb)   LMP 2017   BMI 28 84 kg/m²   FHR: 140 bpm  Vertex via TAUS    Patient's Active Problem List  Patient Active Problem List   Diagnosis    Prenatal care in third trimester    Varicose veins of legs    37 weeks gestation of pregnancy    History of low transverse  section    Rh negative state in antepartum period, third trimester     Soco Zaldivar  2018   2:04 PM

## 2018-04-09 PROBLEM — Z3A.38 38 WEEKS GESTATION OF PREGNANCY: Status: ACTIVE | Noted: 2018-02-19

## 2018-04-09 NOTE — PROGRESS NOTES
Assessment  35 y o  [id number] at 38w4d presenting for routine prenatal visit  Plan  Diagnoses and all orders for this visit:    Prenatal care in third trimester (38wks)  -     POCT urine dip  -     Labor precautions reviewed  -     Return to office in 1wk for PN visit    History of low transverse  section        -     Planning for TOLAC  Hx of successful  previously    Rh negative state in antepartum period, third trimester        -     s/p Rhogam at 28wks        ____________________________________________________________        Niall Chatterjee is a 35 y o  [place] at 38w4d who presents for routine prenatal visit  She is noting increasing pelvic pressure  Denies regular ctxns, loss of fluid, or vaginal bleeding  She feels regular fetal movements  Pregnancy Problems:  Patient Active Problem List   Diagnosis    Prenatal care in third trimester    Varicose veins of legs    38 weeks gestation of pregnancy    History of low transverse  section    Rh negative state in antepartum period, third trimester         Objective  /73   Pulse 104   Wt 76 7 kg (169 lb 3 2 oz)   LMP 2017   BMI 29 04 kg/m²     FHT: 148 BPM   Uterine Size: 38 cm   Pelvic Exam:     Dilation: 2cm    Effacement: 50%    Station:  -2    Consistency: soft    Position: middle     Physical Exam:  Physical Exam   Constitutional: She appears well-developed and well-nourished  No distress  HENT:   Head: Normocephalic and atraumatic  Eyes: No scleral icterus  Pulmonary/Chest: Effort normal  No accessory muscle usage  No respiratory distress  Abdominal: She exhibits distension (gravid)  There is no tenderness  There is no rebound and no guarding  Skin: Skin is warm and dry  No rash noted  No erythema  Psychiatric: She has a normal mood and affect   Her behavior is normal

## 2018-04-10 ENCOUNTER — ROUTINE PRENATAL (OUTPATIENT)
Dept: OBGYN CLINIC | Facility: HOSPITAL | Age: 33
End: 2018-04-10
Payer: COMMERCIAL

## 2018-04-10 VITALS
BODY MASS INDEX: 29.04 KG/M2 | SYSTOLIC BLOOD PRESSURE: 123 MMHG | HEART RATE: 104 BPM | WEIGHT: 169.2 LBS | DIASTOLIC BLOOD PRESSURE: 73 MMHG

## 2018-04-10 DIAGNOSIS — Z67.91 RH NEGATIVE STATE IN ANTEPARTUM PERIOD, THIRD TRIMESTER: ICD-10-CM

## 2018-04-10 DIAGNOSIS — Z98.891 HISTORY OF LOW TRANSVERSE CESAREAN SECTION: ICD-10-CM

## 2018-04-10 DIAGNOSIS — O26.893 RH NEGATIVE STATE IN ANTEPARTUM PERIOD, THIRD TRIMESTER: ICD-10-CM

## 2018-04-10 DIAGNOSIS — Z3A.38 38 WEEKS GESTATION OF PREGNANCY: ICD-10-CM

## 2018-04-10 DIAGNOSIS — Z34.93 PRENATAL CARE IN THIRD TRIMESTER: Primary | ICD-10-CM

## 2018-04-10 LAB
SL AMB  POCT GLUCOSE, UA: NEGATIVE
SL AMB POCT URINE PROTEIN: NEGATIVE

## 2018-04-10 PROCEDURE — PNV: Performed by: OBSTETRICS & GYNECOLOGY

## 2018-04-10 PROCEDURE — 81002 URINALYSIS NONAUTO W/O SCOPE: CPT | Performed by: OBSTETRICS & GYNECOLOGY

## 2018-04-18 ENCOUNTER — ROUTINE PRENATAL (OUTPATIENT)
Dept: OBGYN CLINIC | Facility: HOSPITAL | Age: 33
End: 2018-04-18

## 2018-04-18 VITALS
DIASTOLIC BLOOD PRESSURE: 82 MMHG | HEIGHT: 64 IN | SYSTOLIC BLOOD PRESSURE: 130 MMHG | BODY MASS INDEX: 29.71 KG/M2 | WEIGHT: 174 LBS | HEART RATE: 93 BPM

## 2018-04-18 DIAGNOSIS — Z3A.39 39 WEEKS GESTATION OF PREGNANCY: Primary | ICD-10-CM

## 2018-04-18 DIAGNOSIS — Z34.93 PRENATAL CARE IN THIRD TRIMESTER: ICD-10-CM

## 2018-04-18 PROCEDURE — PNV: Performed by: NURSE PRACTITIONER

## 2018-04-18 NOTE — PATIENT INSTRUCTIONS
Elegir entre un parto vaginal después de fuentes cesárea, o repetición de fuentes cesárea   LO QUE NECESITA SABER:   ¿Qué factores podrían aumentar mi probabilidad de un parto vaginal después de un PVDC? · La incisión de elizabeth cesárea está en la parte baja de elizabeth abdomen  · Usted no ha tenido [employer]  · Usted ha tenido un embarazo normal     · Usted es douglas de 40 años  · Usted tuvo más de 1 parto vaginal     · Elizabeth labor de parto comienza por sí vlad sin la ayuda de medicamentos  ¿Qué factores podrían reducir mi probabilidad de un parto vaginal después de Tonnie Poles cesárea? · Usted tuvo más de 1 cesárea  · Tiene fuentes incisión vertical ham (hacia Yung Nina y [place]) en el abdomen    · El útero se ha roto guilherme un parto anterior  · Elizabeth bebé está en posición sentada (la parte inferior hacia abajo)  · Tiene problemas en el embarazo o fuentes condición médica que hace que un parto vaginal sea peligroso  · Elizabeth bebé pesa 9 libras o más  · Se pasó elizabeth fecha probable de parto  ¿Cuáles son algunos de los riesgos de un parto vaginal después de un PVDC? · Usted tendrá fuentes recuperación más rápida  La mayoría de las veces usted puede regresar a elizabeth hogar dentro de unos días después del parto  El dolor podría disminuir y desaparecer más rápido  Elizabeth cuerpo podría recuperarse más rápido  Es posible que usted pueda regresar a yoon actividades diarias [facility]  · Hay menos riesgos para [employer]  Hay menos probabilidad de infección y lesión de los [place]  Hay un douglas riesgo de sangrado  Usted podría [employer]  [place] podría reducir elizabeth riesgo de coágulos de Paulina  ¿Cuáles son [place] de los riesgos de un parto vaginal después de Tonnie Poles cesárea? La cicatriz de la cesárea en el útero puede romperse guilherme el [place]  [place] puede causar graves problemas de nichol para usted y elizabeth bebé  Es posible que el parto no salga vesna se planeó y usted podría necesitar otra cesárea     ¿Cuáles son [place] beneficios de fuentes cesárea? Joann Drought es fuentes opción más regalado si usted tiene fuentes incisión vertical en la parte superior del abdomen de forde cesárea anterior  También puede ser fuentes opción más regalado si usted tiene ciertos problemas en el embarazo o las condiciones médicas  Si usted desea que [employer] trompas, podría, Ondina procedimiento puede realizarse al mismo tiempo que la cesárea  ¿Cuáles son Graciela Rota riesgos de Mara [place]? Usted podría sangrar más de lo esperado o contraer fuentes infección  Pueden lesionarse forde vejiga o yoon intestinos  [place] puede provocar sangrado y conllevar a problemas con forde bebé que está por nacer  Se le podría formar un coágulo sanguíneo en la pierna  Carm[place]aels podría poner en peligro forde sebastián  Múltiples cesáreas aumentan el riesgo de problemas en embarazos futuros  Forde riesgo de placenta previa aumenta si usted tiene más de 1 cesárea  La placenta previa es fuentes condición que provoca que forde placenta cubra forde shweta uterino  ACUERDOS SOBRE FORDE CUIDADO:   Usted tiene el derecho de ayudar a planear forde cuidado  Aprenda todo lo que pueda sobre forde condición y vesna darle tratamiento  Discuta yoon opciones de tratamiento con yoon médicos para decidir el cuidado que usted desea recibir  Usted siempre tiene el derecho de rechazar el tratamiento  Esta información es sólo para uso en educación  Forde intención no es darle un consejo médico sobre enfermedades o tratamientos  Colsulte con forde Ely Kind farmacéutico antes de seguir cualquier régimen médico para saber si es seguro y efectivo para usted  © 2017 [address] [employer] Information is for End User's use only and may not be sold, redistributed or otherwise used for commercial purposes  All illustrations and images included in CareNotes® are the copyrighted property of A D A M , Inc  or Charles Chavira  Inducción del parto   LO QUE NECESITA SABER:   ¿Qué es fuentes inducción del parto?   La inducción del parto es un procedimiento para inducir (comenzar) elizabeth parto antes de que comience por elizabeth propia cuenta  Se administran medicamentos para provocar las contracciones y ayudar a ablandar, hacer más kalyani y dilatar (abrir) el shweta uterino  ¿Por qué podría necesitar fuentes inducción del parto? · Problemas de nichol de usted, vesna presión arterial ham o diabetes    · Problemas de nichol de elizabeth bebé, vesna latido cardíaco lento o poco crecimiento dentro del útero     · Problemas relacionados con elizabeth embarazo vesna la infección del líquido amniótico, rompe bolsa antes que comience en parto, o tienes poco líquido amniótico    · Se pasó elizabeth fecha probable de parto  ¿Qué ocurre guilherme fuentes inducción del parto? Elizabeth médico puede usar juan m o más de los siguientes métodos para inducir el parto:  · Maduración cervical  es un proceso que ayuda a ablandar y hacer más pequeño el shweta uterino  Pueden utilizarse medicamentos llamados prostaglandinas para madurar el shweta uterino  Estos medicamentos pueden insertarse en la vagina o tomarse en Carolyn Olivera  También pueden utilizarse otros métodos para dilatar el shweta uterino  [place] incluye un catéter con un globo inflable en el extremo que se inserta en el shweta uterino  La solución salina se inyecta a través del catéter y [place] al globo a inflarse  Fuentes sustancia que absorbe el agua también puede insertarse en el shweta uterino para ayudar a dilatarlo  · Desprendimiento de las membranas  es un procedimiento que hace que elizabeth cuerpo libere prostaglandinas naturalmente  Las prostaglandinas ablandan el shweta uterino y pueden ayudar a causar contracciones  Elizabeth médico introduce el dedo dentro de las [place] que conectan el saco amniótico de la pared del Merril Mouse y realizar un movimiento de "barrido"  · La ruptura del saco amniótico  es un procedimiento que se [place] para romper bolsa  Elizabeth médico usará fuentes pequeña herramienta para hacer un agujero en el saco amniótico  [place] puede ayudar a que las contracciones comiencen  · Oxitocina  puede administrarse por vía intravenosa para causar contracciones antolin y regulares  ¿Cuáles son los riesgos de la inducción del parto? Los medicamentos utilizados para inducir el parto pueden provocar demasiadas contracciones  [place] puede disminuir los latidos del bebé y disminuir elizabeth suministro de oxígeno  La inducción del parto también aumenta el riesgo de prolapso del cordón umbilical  Esta afección hace que el cordón se deslice hacia atrás a la vagina antes del parto  Puede comprimir el cordón y [place] suministro de oxígeno de elizabeth bebé  La inducción médica puede causarle fuentes infección a usted o a elizabeth bebé  La inducción [employer] el riesgo de fuentes cesárea, especialmente si es la primera vez que da a brandi  Elizabeth útero puede sufrir fuentes rotura si usted ha tendido fuentes cesárea previamente  ACUERDOS SOBRE ELIZABETH CUIDADO:   Usted tiene el derecho de ayudar a planear elizabeth cuidado  Aprenda todo lo que pueda sobre elizabeth condición y vesna darle tratamiento  Discuta yoon opciones de tratamiento con yoon médicos para decidir el cuidado que usted desea recibir  Usted siempre tiene el derecho de rechazar el tratamiento  Esta información es sólo para uso en educación  Elizabeth intención no es darle un consejo médico sobre enfermedades o tratamientos  Colsulte con elizabeth Magali Daniel farmacéutico antes de seguir cualquier régimen médico para saber si es seguro y efectivo para usted  © 2017 [address] [employer] Information is for End User's use only and may not be sold, redistributed or otherwise used for commercial purposes  All illustrations and images included in CareNotes® are the copyrighted property of A D A M , Inc  or Charles Cait  Conteo de patadas en el embarazo   CUIDADO AMBULATORIO:   El conteo de patadas  mide cuánto se está moviendo elizabeth bebé en el útero  Fuentes patada de elizabeth bebé podría sentirse vesna fuentes torcedura, fuentes vuelta, un crujido, un meneo o un golpe   Es común sentir a tu bebé patear a las 26 a [address] [facility]  Es posible que sienta al bebé patear ya a las 20 semanas de [place]  Busque atención médica de inmediato si:   · Usted siente que elizabeth bebé patea menos conforme pasa el día  · Usted no siente ninguna patada en un día  Pregúntele a elizabeth [place] vitaminas y minerales son adecuados para usted  · Usted siente un cambio en el número de patadas o movimientos de elizabeth bebé  · Usted siente menos de 10 patadas dentro de 2 horas después de contar 2 veces  · Usted tiene preguntas o inquietudes acerca de los movimientos de elizabeth bebé  Por qué realizar el conteo de patadas:  Los movimientos de elizabeth bebé podrían proporcionar información de la nichol de elizabeth bebé  Es posible que si hay problemas, elizabeth bebé se mueva menos o nada en lo absoluto  Él podría moverse menos si no tiene suficiente espacio para desarrollarse en elizabeth útero (vientre), o si no está obteniendo suficiente oxígeno o nutrientes de la placenta  Informe a elizabeth médico tan pronto vesna usted sienta un cambio en los movimientos de elizabeth bebé  Los problemas que se encuentran en fuentes etapa más temprana son más fáciles de tratar  ¿Cuándo tengo que realizar el conteo de patadas? Cuándo realizar el conteo de patadas:   · Cuente las patadas en el mismo horario todos los GRASSE  · Realice el conteo de las patadas cuando elizabeth bebé esté despierto y [place]  Elizabeth bebé podría estar más activo en la tarde  · Realice el conteo de las patadas después de comer o davis un refrigerio  Elizabeth bebé podría estar más activo después de que usted coma  Espere 2 horas después de beber líquidos que contengan cafeína  La cafeína puede hacer que elizabeth bebé esté más activo que lo normal     · Usted no debe fumar mientras está embarazada  El fumar aumenta el riesgo de problemas de nichol para usted y para elizabeth bebé guilherme el [facility]  Si usted fuma, espere 2 horas para realizar el conteo de patadas   La nicotina puede hacer que elizabeth bebé sea [place] activo de lo usual   Cómo realizar el conteo de patadas:  Revise que elizabeth bebé esté despierto antes de realizar el conteo de patadas  Usted puede despertar a elizabeth bebé empujando elizabeth estómago suavemente, caminando o tomando algo frío  Elizabeth médico podría indicarle diferentes maneras de realizar el conteo  Él podría decirle que alvin lo siguiente:  · Use fuentes gráfica o un reloj para mantener un registro de la hora en que comienza y termina de contar  · Siéntese en fuentes silla o acuéstese en elizabeth costado derecho  · Coloque yoon timothy en la parte más jonathan de elizabeth BJURHOLM  · Cuente hasta que llegue a las 10 patadas  Escriba cuánto tiempo le lleva contar las 10 patadas  · Podría davis de 30 minutos a 2 horas para contar 10 patadas  No debería de davis más de 2 horas para contar 10 patadas  · Si usted no siente las 10 patadas dentro de 2 horas, espere 1 hora y cuente de [place]  Elizabeth bebé puede dormir hasta por 40 minutos a la vez  Acuda a yoon consultas de control con elizabeth médico según le indicaron  Anote yoon preguntas para que se acuerde de hacerlas guilherme yoon visitas  © 2017 [address] [facility] Information is for End User's use only and may not be sold, redistributed or otherwise used for commercial purposes  All illustrations and images included in CareNotes® are the copyrighted property of A D A KOPIS MOBILE , Inc  or Charles Chavira  Esta información es sólo para uso en educación  Elizabeth intención no es darle un consejo médico sobre enfermedades o tratamientos  Colsulte con elizabeth Melven Rimes farmacéutico antes de seguir cualquier régimen médico para saber si es seguro y efectivo para usted  El embarazo de la semana 44 a la 40   LO QUE NECESITA SABER:   ¿Qué cambios están ocurriendo en mi cuerpo? Usted ahora está acercándose a elizabeth fecha de [place]  Elizabeth fecha de parto es sólo fuentes estimación de cuándo nacerá elizabeth bebé  Elizabeth bebé podría nacer antes o después de elizabeth fecha de parto   Podría ser mas fácil que usted respire si elizabeth bebé se ha posicionado con la cyn hacia abajo  Es posible que usted necesite orinar con mayor frecuencia ya que el bebé podría estar presionando elizabeth vejiga  Usted también podría sentir más molestias y cansarse fácilmente  También podría tener dificultad para dormir  ¿Cómo me robin cuidar en esta etapa de mi embarazo? · Consuma alimentos saludables y variados  Alimentos saludables incluyen frutas, verduras, panes de manny integral, alimentos lácteos bajos en grasa, frijoles, lashay magras y pescado  [place] líquidos vesna se le haya indicado  Pregunte cuánto líquido debe davis cada día y cuáles líquidos son los más adecuados para usted  Limite el consumo de cafeína a menos de [place] [address]  Limite el consumo de pescado a 2 porciones cada semana  Escoja pescado con concentraciones bajas de janna vesna atún ligero enlatado, camarón, cangrejo, salmón, bacalao o tilapia  No  coma pescado con concentraciones altas de janna vesna pez elsa, caballa gigante, pargo rayado y tiburón  · [address] [place]  Elizabeth necesidad de ciertas vitaminas y [facility], vesna el ácido fólico, aumenta guilherme el [facility]  Las vitaminas prenatales proporcionan algunas de las vitaminas y minerales adicionales que usted necesita  Las vitaminas prenatales también podrían ayudar a disminuir el riesgo de ciertos defectos de nacimiento  · Descanse tanto vesna sea necesario  [place] yoon pies si tiene inflamación en yoon tobillos y pies  · No fume  Si usted fuma, nunca es demasiado tarde para dejar de hacerlo  Fumar aumenta el riesgo de aborto espontáneo y otros problemas de nichol guilherme elizabeth [place]  Fumar puede causar que elizabeth bebé nazca antes de tiempo o que pese menos al nacer  Solicite información a elizabeth médico si usted necesita ayuda para dejar de fumar  · No consuma alcohol  El alcohol pasa de elizabeth cuerpo al bebé a través de la placenta   Puede afectar el desarrollo del cerebro de garrett bebé y provocar el síndrome de alcoholismo fetal (SAF)  FAS es un santos de condiciones que causan [address] [place] [address] [address], de comportamiento y de crecimiento  · Consulte con garrett médico antes de davis cualquier medicamento  Muchos medicamentos pueden perjudicar a garrett bebé si usted los irish [address]  No tome ningún medicamento, vitaminas, hierbas o suplementos sin keli consultar con garrett Alecia Mayor  use drogas ilegales o de la yates (vesna marihuana o cocaína) mientras está embarazada  · Hable con garrett médico antes de viajar  Es posible que no pueda viajar en avión después de las [facility]  También le puede recomendar que evite largos viajes por carretera  ¿Cuáles son Skip Robinsons consejos de seguridad guilherme el embarazo? · Evite jacuzzis y saunas  No use un jacuzzi o un sauna mientras usted está embarazada, especialmente guilherme el primer trimestre  Los [facility] y los saunas aumentan la temperatura de garrett bebé y el riesgo de defectos de nacimiento  · Evite la toxoplasmosis  [place] es fuentes infección causada por comer carne cruda o estar cerca del excremento de un haroldo infectado  [place] puede causar malformaciones congénitas, aborto espontáneo y Tony Schein  Lávese las timothy después de tocar carne cruda  Asegúrese de que la carne esté caden cocida antes de comerla  Evite los huevos crudos y la [place]  Use guantes o pida que alguien la ayude a limpiar la caja de arena del haroldo mientras usted Renate Dakin  · Consulte con garrett médico acerca de viajar  El [address] cómodo para viajar es guilherme el lizett trimestre  Pregunte a garrett médico si usted puede viajar después de las 36 semanas  Es posible que no pueda viajar en avión después de las [address]  También le puede recomendar que evite largos viajes por carretera  ¿Qué cambios están ocurriendo con mi bebé? Garrett bebé está listo para nacer   Al momento de nacer, garrett bebé podría pesar alrededor de 6 a 9 libras y [employer] alrededor de 23 a 21 pulgadas de jacob  Elizabeth bebé podría estar en posición con la cyn hacia abajo  Elizabeth bebé también descansará en la parte inferior de elizabeth abdomen  ¿Qué necesito saber acerca del cuidado prenatal?  Elizabeth médico le revisará elizabeth presión arterial y [place]  Es posible que también necesite lo siguiente:  · Un examen de orina  también podría realizarse para revisarle el azúcar y la proteína  Estas son señales de diabetes gestacional o de infección  La proteína en elizabeth orina también podría ser fuentes señal de preeclampsia  La preeclampsia es fuentes condición que puede desarrollarse guilherme la semana 21 o después en elizabeth embarazo  Esta provoca presión arterial ham y [employer] con yoon riñones y [place]  · El ritmo cardíaco de elizabeth bebé  será revisado  ¿Cuándo robin buscar atención inmediata? · Usted presenta un alphonse dolor de cyn que no desaparece  · Usted tiene cambios en la visión nuevos o en aumento, vesna visión borrosa o con manchas  · Usted tiene inflamación nueva o creciente en elizabeth sylvester o timothy  · Usted tiene manchado o sangrado vaginal     · Usted rompe stephanie o siente un chorro o gotas de agua tibia que le está bajando por elizabeth vagina  ¿Cuándo robin comunicarme con mi médico?   · Usted tiene más de 5 contracciones en 1 hora  · Usted nota algún cambio en los movimientos de elizabeth bebé  · Usted tiene calambres, presión o tensión abdominal     · Usted tiene un cambio en la secreción vaginal     · Usted tiene escalofríos o fiebre  · Usted tiene comezón, ardor o dolor vaginal      · Usted tiene fuentes secreción vaginal amarillenta, verdosa, kirk o de [employer]  · Usted tiene dolor o ardor al [place], orina menos de lo habitual o tiene [place] rosada o sanguinolenta  · Usted tiene preguntas o inquietudes acerca de elizabeth condición o cuidado  ACUERDOS SOBRE ELIZABETH CUIDADO:   ted tiene el derecho de ayudar a planear elizabeth cuidado   Aprenda todo lo que pueda [facility] y vesna darle tratamiento  Discuta yoon opciones de tratamiento con yoon médicos para decidir el cuidado que usted desea recibir  Usted siempre tiene el derecho de rechazar el tratamiento  Esta información es sólo para uso en educación  Elizabeth intención no es darle un consejo médico sobre enfermedades o tratamientos  Colsulte con elizabeth [place] farmacéutico antes de seguir cualquier régimen médico para saber si es seguro y efectivo para usted  © 2017 [address] [employer] Information is for End User's use only and may not be sold, redistributed or otherwise used for commercial purposes  All illustrations and images included in CareNotes® are the copyrighted property of A D A M , Inc  or [address]

## 2018-04-18 NOTE — PROGRESS NOTES
Denies loss of fluid, vaginal bleeding and regular contractions  Feels mild cramping at times  Confirms frequent fetal movements  Tolerating prenatal vitamin and iron well  Continues to desire TOLAC,  Had a successful  2014  Proven to 6 lb 12 oz  Initials primary  was for fetal intolerance to labor  No concerns today   Patient agreeable to induction of labor as needed, written information provided  PLAN:  1  Continue fetal kick counts daily  2  Continue prenatal vitamin and iron daily  3  Signs and symptoms of labor, common discomforts of pregnancy and precautions reviewed  Patient verbalizes understanding how to reach [facility] on-call after office hours    RTO   1 week  Will  Schedule induction of labor at next visit

## 2018-04-25 ENCOUNTER — ANESTHESIA (INPATIENT)
Dept: LABOR AND DELIVERY | Facility: HOSPITAL | Age: 33
End: 2018-04-25
Payer: COMMERCIAL

## 2018-04-25 ENCOUNTER — ANESTHESIA EVENT (INPATIENT)
Dept: LABOR AND DELIVERY | Facility: HOSPITAL | Age: 33
End: 2018-04-25
Payer: COMMERCIAL

## 2018-04-25 ENCOUNTER — HOSPITAL ENCOUNTER (INPATIENT)
Facility: HOSPITAL | Age: 33
LOS: 1 days | Discharge: HOME | End: 2018-04-26
Attending: OBSTETRICS & GYNECOLOGY | Admitting: OBSTETRICS & GYNECOLOGY
Payer: COMMERCIAL

## 2018-04-25 DIAGNOSIS — O34.219 VBAC, DELIVERED: Primary | ICD-10-CM

## 2018-04-25 DIAGNOSIS — Z3A.40 40 WEEKS GESTATION OF PREGNANCY: ICD-10-CM

## 2018-04-25 PROBLEM — Z98.891 HISTORY OF VBAC: Status: ACTIVE | Noted: 2018-04-25

## 2018-04-25 PROBLEM — Z67.41 TYPE O BLOOD, RH NEGATIVE: Status: ACTIVE | Noted: 2018-03-23

## 2018-04-25 LAB
ABO GROUP BLD: NORMAL
BASE EXCESS BLDCOA CALC-SCNC: -1.3 MMOL/L (ref 3–11)
BASE EXCESS BLDCOV CALC-SCNC: -0.9 MMOL/L (ref 1–9)
BASOPHILS # BLD AUTO: 0.01 THOUSANDS/ΜL (ref 0–0.1)
BASOPHILS NFR BLD AUTO: 0 % (ref 0–1)
BLD GP AB SCN SERPL QL: NEGATIVE
EOSINOPHIL # BLD AUTO: 0.02 THOUSAND/ΜL (ref 0–0.61)
EOSINOPHIL NFR BLD AUTO: 0 % (ref 0–6)
ERYTHROCYTE [DISTWIDTH] IN BLOOD BY AUTOMATED COUNT: 13.5 % (ref 11.6–15.1)
HCO3 BLDCOA-SCNC: 25.6 MMOL/L (ref 17.3–27.3)
HCO3 BLDCOV-SCNC: 24.1 MMOL/L (ref 12.2–28.6)
HCT VFR BLD AUTO: 37.6 % (ref 34.8–46.1)
HGB BLD-MCNC: 12.9 G/DL (ref 11.5–15.4)
LYMPHOCYTES # BLD AUTO: 1.68 THOUSANDS/ΜL (ref 0.6–4.47)
LYMPHOCYTES NFR BLD AUTO: 11 % (ref 14–44)
MCH RBC QN AUTO: 29.2 PG (ref 26.8–34.3)
MCHC RBC AUTO-ENTMCNC: 34.3 G/DL (ref 31.4–37.4)
MCV RBC AUTO: 85 FL (ref 82–98)
MONOCYTES # BLD AUTO: 0.76 THOUSAND/ΜL (ref 0.17–1.22)
MONOCYTES NFR BLD AUTO: 5 % (ref 4–12)
NEUTROPHILS # BLD AUTO: 13.1 THOUSANDS/ΜL (ref 1.85–7.62)
NEUTS SEG NFR BLD AUTO: 84 % (ref 43–75)
NRBC BLD AUTO-RTO: 0 /100 WBCS
O2 CT VFR BLDCOA CALC: 6.9 ML/DL
OXYHGB MFR BLDCOA: 32.8 %
OXYHGB MFR BLDCOV: 67.8 %
PCO2 BLDCOA: 51.5 MM[HG] (ref 30–60)
PCO2 BLDCOV: 41.2 MM HG (ref 27–43)
PH BLDCOA: 7.32 [PH] (ref 7.23–7.43)
PH BLDCOV: 7.38 [PH] (ref 7.19–7.49)
PLATELET # BLD AUTO: 173 THOUSANDS/UL (ref 149–390)
PMV BLD AUTO: 12.1 FL (ref 8.9–12.7)
PO2 BLDCOA: 16.3 MM HG (ref 5–25)
PO2 BLDCOV: 27.9 MM HG (ref 15–45)
RBC # BLD AUTO: 4.42 MILLION/UL (ref 3.81–5.12)
RH BLD: NEGATIVE
SAO2 % BLDCOV: 15.4 ML/DL
SPECIMEN EXPIRATION DATE: NORMAL
WBC # BLD AUTO: 15.63 THOUSAND/UL (ref 4.31–10.16)

## 2018-04-25 PROCEDURE — 86592 SYPHILIS TEST NON-TREP QUAL: CPT | Performed by: OBSTETRICS & GYNECOLOGY

## 2018-04-25 PROCEDURE — 85025 COMPLETE CBC W/AUTO DIFF WBC: CPT | Performed by: OBSTETRICS & GYNECOLOGY

## 2018-04-25 PROCEDURE — 82805 BLOOD GASES W/O2 SATURATION: CPT | Performed by: OBSTETRICS & GYNECOLOGY

## 2018-04-25 PROCEDURE — 86901 BLOOD TYPING SEROLOGIC RH(D): CPT | Performed by: OBSTETRICS & GYNECOLOGY

## 2018-04-25 PROCEDURE — 59400 OBSTETRICAL CARE: CPT | Performed by: OBSTETRICS & GYNECOLOGY

## 2018-04-25 PROCEDURE — 99213 OFFICE O/P EST LOW 20 MIN: CPT

## 2018-04-25 PROCEDURE — 86850 RBC ANTIBODY SCREEN: CPT | Performed by: OBSTETRICS & GYNECOLOGY

## 2018-04-25 PROCEDURE — 10907ZC DRAINAGE OF AMNIOTIC FLUID, THERAPEUTIC FROM PRODUCTS OF CONCEPTION, VIA NATURAL OR ARTIFICIAL OPENING: ICD-10-PCS | Performed by: OBSTETRICS & GYNECOLOGY

## 2018-04-25 PROCEDURE — 86900 BLOOD TYPING SEROLOGIC ABO: CPT | Performed by: OBSTETRICS & GYNECOLOGY

## 2018-04-25 PROCEDURE — 0UQMXZZ REPAIR VULVA, EXTERNAL APPROACH: ICD-10-PCS | Performed by: OBSTETRICS & GYNECOLOGY

## 2018-04-25 RX ORDER — FERROUS SULFATE 325(65) MG
325 TABLET ORAL 2 TIMES DAILY
Status: DISCONTINUED | OUTPATIENT
Start: 2018-04-26 | End: 2018-04-26 | Stop reason: HOSPADM

## 2018-04-25 RX ORDER — ACETAMINOPHEN 325 MG/1
650 TABLET ORAL EVERY 6 HOURS PRN
Status: DISCONTINUED | OUTPATIENT
Start: 2018-04-25 | End: 2018-04-26 | Stop reason: HOSPADM

## 2018-04-25 RX ORDER — OXYCODONE HYDROCHLORIDE AND ACETAMINOPHEN 5; 325 MG/1; MG/1
1 TABLET ORAL EVERY 4 HOURS PRN
Status: DISCONTINUED | OUTPATIENT
Start: 2018-04-25 | End: 2018-04-26 | Stop reason: HOSPADM

## 2018-04-25 RX ORDER — OXYCODONE HYDROCHLORIDE AND ACETAMINOPHEN 5; 325 MG/1; MG/1
2 TABLET ORAL EVERY 4 HOURS PRN
Status: DISCONTINUED | OUTPATIENT
Start: 2018-04-25 | End: 2018-04-26 | Stop reason: HOSPADM

## 2018-04-25 RX ORDER — CALCIUM CARBONATE 200(500)MG
1000 TABLET,CHEWABLE ORAL DAILY PRN
Status: DISCONTINUED | OUTPATIENT
Start: 2018-04-25 | End: 2018-04-26 | Stop reason: HOSPADM

## 2018-04-25 RX ORDER — SODIUM CHLORIDE, SODIUM LACTATE, POTASSIUM CHLORIDE, CALCIUM CHLORIDE 600; 310; 30; 20 MG/100ML; MG/100ML; MG/100ML; MG/100ML
125 INJECTION, SOLUTION INTRAVENOUS CONTINUOUS
Status: DISCONTINUED | OUTPATIENT
Start: 2018-04-25 | End: 2018-04-25

## 2018-04-25 RX ORDER — ONDANSETRON 2 MG/ML
4 INJECTION INTRAMUSCULAR; INTRAVENOUS EVERY 8 HOURS PRN
Status: DISCONTINUED | OUTPATIENT
Start: 2018-04-25 | End: 2018-04-26 | Stop reason: HOSPADM

## 2018-04-25 RX ORDER — FENTANYL CITRATE 50 UG/ML
INJECTION, SOLUTION INTRAMUSCULAR; INTRAVENOUS
Status: COMPLETED
Start: 2018-04-25 | End: 2018-04-25

## 2018-04-25 RX ORDER — OXYTOCIN/RINGER'S LACTATE 30/500 ML
PLASTIC BAG, INJECTION (ML) INTRAVENOUS
Status: COMPLETED
Start: 2018-04-25 | End: 2018-04-25

## 2018-04-25 RX ORDER — IBUPROFEN 600 MG/1
600 TABLET ORAL EVERY 6 HOURS PRN
Status: DISCONTINUED | OUTPATIENT
Start: 2018-04-25 | End: 2018-04-26 | Stop reason: HOSPADM

## 2018-04-25 RX ORDER — FENTANYL CITRATE 50 UG/ML
INJECTION, SOLUTION INTRAMUSCULAR; INTRAVENOUS AS NEEDED
Status: DISCONTINUED | OUTPATIENT
Start: 2018-04-25 | End: 2018-04-25 | Stop reason: SURG

## 2018-04-25 RX ORDER — OXYTOCIN/RINGER'S LACTATE 30/500 ML
250 PLASTIC BAG, INJECTION (ML) INTRAVENOUS CONTINUOUS
Status: DISCONTINUED | OUTPATIENT
Start: 2018-04-25 | End: 2018-04-26 | Stop reason: HOSPADM

## 2018-04-25 RX ORDER — BUTORPHANOL TARTRATE 1 MG/ML
1 INJECTION, SOLUTION INTRAMUSCULAR; INTRAVENOUS ONCE AS NEEDED
Status: DISCONTINUED | OUTPATIENT
Start: 2018-04-25 | End: 2018-04-25

## 2018-04-25 RX ORDER — BUPIVACAINE HYDROCHLORIDE 2.5 MG/ML
INJECTION, SOLUTION INFILTRATION; PERINEURAL AS NEEDED
Status: DISCONTINUED | OUTPATIENT
Start: 2018-04-25 | End: 2018-04-25 | Stop reason: SURG

## 2018-04-25 RX ORDER — DOCUSATE SODIUM 100 MG/1
100 CAPSULE, LIQUID FILLED ORAL 2 TIMES DAILY
Status: DISCONTINUED | OUTPATIENT
Start: 2018-04-25 | End: 2018-04-26 | Stop reason: HOSPADM

## 2018-04-25 RX ORDER — DIPHENHYDRAMINE HCL 25 MG
25 TABLET ORAL EVERY 6 HOURS PRN
Status: DISCONTINUED | OUTPATIENT
Start: 2018-04-25 | End: 2018-04-26 | Stop reason: HOSPADM

## 2018-04-25 RX ORDER — DIAPER,BRIEF,INFANT-TODD,DISP
1 EACH MISCELLANEOUS AS NEEDED
Status: DISCONTINUED | OUTPATIENT
Start: 2018-04-25 | End: 2018-04-26 | Stop reason: HOSPADM

## 2018-04-25 RX ORDER — ONDANSETRON 2 MG/ML
4 INJECTION INTRAMUSCULAR; INTRAVENOUS EVERY 4 HOURS PRN
Status: DISCONTINUED | OUTPATIENT
Start: 2018-04-25 | End: 2018-04-25

## 2018-04-25 RX ADMIN — Medication 250 UNITS: at 16:34

## 2018-04-25 RX ADMIN — BENZOCAINE AND LEVOMENTHOL: 200; 5 SPRAY TOPICAL at 19:08

## 2018-04-25 RX ADMIN — SODIUM CHLORIDE, SODIUM LACTATE, POTASSIUM CHLORIDE, AND CALCIUM CHLORIDE 125 ML/HR: .6; .31; .03; .02 INJECTION, SOLUTION INTRAVENOUS at 11:31

## 2018-04-25 RX ADMIN — BUPIVACAINE HYDROCHLORIDE 1 ML: 2.5 INJECTION, SOLUTION INFILTRATION; PERINEURAL at 11:55

## 2018-04-25 RX ADMIN — ROPIVACAINE HYDROCHLORIDE: 2 INJECTION, SOLUTION EPIDURAL; INFILTRATION at 12:04

## 2018-04-25 RX ADMIN — DOCUSATE SODIUM 100 MG: 100 CAPSULE, LIQUID FILLED ORAL at 19:09

## 2018-04-25 RX ADMIN — FENTANYL CITRATE 10 MCG: 50 INJECTION, SOLUTION INTRAMUSCULAR; INTRAVENOUS at 11:55

## 2018-04-25 RX ADMIN — SODIUM CHLORIDE, SODIUM LACTATE, POTASSIUM CHLORIDE, AND CALCIUM CHLORIDE 125 ML/HR: .6; .31; .03; .02 INJECTION, SOLUTION INTRAVENOUS at 10:54

## 2018-04-25 NOTE — OB LABOR/OXYTOCIN SAFETY PROGRESS
Labor Progress Note - August Alana 35 y o  female MRN: [id number]    Unit/Bed#:  303-01 Encounter: [id number]    Obstetric History       T2      L2     SAB0   TAB0   Ectopic0   Multiple0   Live Births2    Obstetric Comments   Hx of       Gestational Age: 39w6d     Contraction Frequency (minutes): 4-5  Contraction Quality: Strong  Tachysystole: No   Dilation: 8-9        Effacement (%): 90  Station: 1  Baseline Rate: 145 bpm  Fetal Heart Rate: 154 BPM  FHR Category: Category I          Notes/comments:   Pt making cervical change   Continue expectant management          Dena Riley MD 2018 2:41 PM

## 2018-04-25 NOTE — ANESTHESIA PREPROCEDURE EVALUATION
Prior C/Section  Under Regional  NPO past MN x albin juice this  Review of Systems/Medical History          Cardiovascular   Pulmonary  Not a smoker , No asthma: ,        GI/Hepatic            Endo/Other     GYN  , Prior pregnancy/OB history ,          Hematology   Musculoskeletal       Neurology   Psychology           Physical Exam    Airway    Mallampati score: I  TM Distance: >3 FB  Neck ROM: full     Dental       Cardiovascular      Pulmonary      Other Findings        Lab Results   Component Value Date    HCT 37 6 04/25/2018    HGB 12 9 04/25/2018     04/25/2018    WBC 15 63 (H) 04/25/2018     Anesthesia Plan  ASA Score- 2     Anesthesia Type- epidural and spinal with ASA Monitors  Additional Monitors:   Airway Plan:         Plan Factors-    Induction-     Postoperative Plan-     Informed Consent- Anesthetic plan and risks discussed with patient and spouse

## 2018-04-25 NOTE — OB LABOR/OXYTOCIN SAFETY PROGRESS
Labor Progress Check Note - Adrián Lobato 35 y o  female MRN: [id number]    Unit/Bed#: -01 Encounter: [id number]    Obstetric History       T2      L2     SAB0   TAB0   Ectopic0   Multiple0   Live Births2    Obstetric Comments   Hx of       Gestational Age: 39w6d     Contraction Frequency (minutes): 3-4  Contraction Quality: Strong  Tachysystole: No   Dilation: 7        Effacement (%): 90  Station: 0  Baseline Rate: 140 bpm  Fetal Heart Rate: 154 BPM  FHR Category: Category I          Notes/comments:   Pt comfortable with epidural  SVE unchanged  AROM for clear fluid at 1240  Will continue expectant management   Recheck in 2hrs or PRN          Kathryn Lucia MD 2018 12:43 PM

## 2018-04-25 NOTE — DISCHARGE SUMMARY
Discharge Summary - Lennox Roa 35 y o  female MRN: [id number]    Unit/Bed#: -01 Encounter: [id number]    Admission Date: 2018     Discharge Date: 18    Admitting Diagnosis:   1  Pregnancy at 40w5d  2  Labor  3  Hx of prior  section  4  Hx of successful     Discharge Diagnosis: same, delivered    Procedures: vaginal birth after  (), repair of right periurethral laceration    Attending: Naomi Suarez MD    Hospital Course:     Lennox Roa is a 35 y o   at 40w5d wks who was initially admitted for active labor  Initial exam  /0  Pt was managed expectantly  She received an epidural for anesthesia  Amniotomy was performed for clear fluid at 1240  Pt progressed to complete dilation  She delivered a viable male  on 18 at 1628  Weight 7 lbs 0 7 oz via vaginal birth after  ()  Apgars were 9 (1 min) and 9 (5 min)  Patient tolerated the procedure well and was allowed to recover in labor room prior to transfer to the post-partum floor in stable condition  Her postpartum course was uncomplicated  Pre-delivery hemoglobin was 12 9  Her postpartum pain was well controlled with oral analgesics  Patient received RhoGAM postpartum due to maternal Rh (-) status  Consent for permanent sterilization was obtained 18 after discussion with the patient regarding the surgical risks and alternative forms of contraception  Patient received depo shot prior to discharge as a bridge to permanent sterilization  On day of discharge, she was ambulating and able to reasonably perform all ADLs  She was voiding and had appropriate bowel function  Pain was well controlled  She requested early discharge and was discharged home on postpartum day #1 without complications   Patient was instructed to follow up with her OB as an outpatient and was given appropriate warnings to call provider if she develops signs of infection or uncontrolled pain     Complications: none apparent    Condition at discharge: good     Discharge instructions/Information to patient and family:   See after visit summary for information provided to patient and family  Provisions for Follow-Up Care:  See after visit summary for information related to follow-up care and any pertinent home health orders        Disposition: Home    Planned Readmission: JANINE Molina  PGY-1  Dilia LINDSAY 2

## 2018-04-25 NOTE — ANESTHESIA PROCEDURE NOTES
CSE Block    Patient location during procedure: OB  Start time: 4/25/2018 11:45 AM  Reason for block: procedure for pain and at surgeon's request  Staffing  Anesthesiologist: Luana Cruz  Performed: anesthesiologist   Preanesthetic Checklist  Completed: patient identified, surgical consent, pre-op evaluation, timeout performed, IV checked, risks and benefits discussed and monitors and equipment checked  CSE  Patient position: sitting  Prep: Betadine  Patient monitoring: heart rate, continuous pulse ox and frequent blood pressure checks  Approach: midline  Spinal Needle  Needle type: pencil-tip   Needle gauge: 27 G  Needle length: 10 cm  Epidural Needle  Injection technique: BEN air  Needle type: Tuohy   Epidural needle gauge: 17G  Needle insertion depth: 5 cm  Location: lumbar (1-5) (L4/5)  Catheter  Catheter type: side hole  Catheter size: 19G  Catheter at skin depth: 11 cm  Assessment  Injection Assessment:  negative aspiration for heme, no pain on injection, no paresthesia on injection and positive aspiration for clear CSF  Additional Notes  Epidural X 1 attempt  L/P with 27G, Clear CSF aspirated  1 ml  0 25% Bupivicaine with 10 mcgs  Fentanyl injected intrathecal  27G needle out  After no aspiration of CSF or Heme, 3mls  N/S injected via 17G Needle  19 G Epidural catheter inserted w/o incident  Taped  Pt  Supine  Back elevated  R Hip wedge  Good pain relief  Bilat  Leg numbness, R=L   Started infusion of 0 2% Ropivicaine at 10 mls /hr

## 2018-04-25 NOTE — L&D DELIVERY NOTE
DELIVERY NOTE  Stiven Jordan 35 y o  female MRN: [id number]  Unit/Bed#:  303-01 Encounter: [id number]    Obstetrician:   Dr Yanet Cooper    Assistant: Dr Kajal Khan, Dr Jenelle Barrios    Pre-Delivery Diagnosis: Term pregnancy  Spontaneous labor  Single fetus  Hx of prior  section  Hx of successful     Post-Delivery Diagnosis: Same as above - Delivered  Periurethral tear    Procedure: Spontaneous vaginal delivery  Repair right periurethral spontaneous laceration      Indications for instrumental delivery: None    Estimated Blood Loss:  200mL     Cord Blood Gases:   Arterial pH:7 315, BE: -1 3  Venous pH: 7 385, BE: -0 9    Complications:  None    Description of Delivery:  Stiven Jordan is a 35 y o   at 40w5d wks who was initially admitted for active labor  Initial exam    Pt was managed expectantly  She received an epidural for anesthesia  Amniotomy was performed for clear fluid at 1240  Pt progressed to complete dilation  Patient delivered a viable Male  over right periurethral laceration at 1628 on 18, APGARs 9/9, weight pending  A nuchal cord was noted x1, loose and reduced  With the assistance of maternal expulsive efforts and downward traction of fetal head, the anterior shoulder was delivered without difficulty, followed by the remainder of the infant's body  The  was placed on the mother's chest and provided routine care by the  staff  There was delayed clamping of the umbilical cord, after which, was doubly clamped and cut  Umbilical cord blood and umbilical artery and venous gases were collected  Placenta was delivered with fundal massage and gentle traction on the cord with active management of the third stage of labor  Placenta delivered intact with a 3-vessel cord at 36  Active management of the third stage of labor was undertaken with IV pitocin  Bimanual exam was performed and clots evacuated  Uterus noted to be firm and contracted down   Bleeding was noted to be under control  Inspection of the perineum, vagina, labia, and urethra revealed a right periurethral which was then repaired in standard fashion with 3-0 Vicryl rapid  The lacerations showed good tissue reapproximation and hemostasis  Mother and baby are currently recovering nicely in stable condition  Dr Sarkis Melton was present and participated in the entire delivery      Cornelia Gallegos MD  OBGYN, PGY-1  4/25/2018 4:50 PM

## 2018-04-25 NOTE — H&P
H&P Exam - Obstetrics   Jean Decker 35 y o  female MRN: [id number]  Unit/Bed#: -01 Encounter: [id number]      History of Present Illness     Chief Complaint: Contractions    HPI:  Jean Decker is a 35 y o   female with an TANVI of 2018, by Ultrasound at 40w5d weeks gestation who is being admitted for Active labor  Her current obstetrical history is significant for prior   Contractions: None, Date/time of onset: 18 at 0100, Frequency: Every 2-5 minutes and Intensity: strong  Leakage of fluid: None  Bleeding: None  Fetal movement: present  PREGNANCY COMPLICATIONS: prior     OB History    Para Term  AB Living   3 2 2 0 0 2   SAB TAB Ectopic Multiple Live Births   0 0 0 0 2      # Outcome Date GA Lbr Mook/2nd Weight Sex Delivery Anes PTL Lv   3 Current            2 Term 14 39w6d  3062 g (6 lb 12 oz) M    LIA   1 Term 12 40w0d  3118 g (6 lb 14 oz) F CS-LTranv   LIA      Complications: Fetal Intolerance      Obstetric Comments   Hx of         Baby complications/comments: None    Review of Systems   Constitutional: Negative  HENT: Negative  Eyes: Negative  Respiratory: Negative  Cardiovascular: Negative  Gastrointestinal: Negative  Endocrine: Negative  Genitourinary: Negative for genital sores, pelvic pain, urgency, vaginal bleeding, vaginal discharge and vaginal pain  Musculoskeletal: Negative  Skin: Negative  Allergic/Immunologic: Negative  Neurological: Negative  Hematological: Negative  Psychiatric/Behavioral: Negative  All other systems reviewed and are negative        Historical Information   Past Medical History:   Diagnosis Date    Patient desires pregnancy     Last assessed - 17    Rh negative, antepartum     Resolved - 10/24/14    Varicella     childhood     Past Surgical History:   Procedure Laterality Date     SECTION  2012    LTCS     Social History   History Alcohol Use No     History   Drug Use No     History   Smoking Status    Never Smoker   Smokeless Tobacco    Never Used     Family History: non-contributory    Meds/Allergies      Prescriptions Prior to Admission   Medication    Ferrous Sulfate (IRON) 325 (65 Fe) MG TABS    Prenatal Vit-Fe Fumarate-FA (PRENATAL COMPLETE PO)        Allergies   Allergen Reactions    Pollen Extract        OBJECTIVE:    Vitals: Blood pressure 129/90, pulse 105, temperature 98 2 °F (36 8 °C), temperature source Oral, resp  rate 20, height 5' 4" (1 626 m), weight 78 9 kg (174 lb), last menstrual period 07/29/2017, currently breastfeeding  Body mass index is 29 87 kg/m²  Physical Exam   Constitutional: She is oriented to person, place, and time  She appears well-developed and well-nourished  HENT:   Head: Normocephalic and atraumatic  Cardiovascular: Normal rate, regular rhythm and normal heart sounds  Pulmonary/Chest: Effort normal and breath sounds normal    Abdominal: Soft  She exhibits no distension and no mass  There is no tenderness  There is no rebound and no guarding  Genitourinary:   Genitourinary Comments: Uterus NT S=D, EFW 6 5lbs   Musculoskeletal: Normal range of motion  Neurological: She is alert and oriented to person, place, and time  Skin: Skin is warm and dry  Psychiatric: She has a normal mood and affect  Her behavior is normal    Nursing note and vitals reviewed  Cervix:  Dilation: 7  Effacement (%): 90  Station: 0  Cervical Characteristics: Anterior    Fetal heart rate:  Baseline Rate: 140 bpm  Variability: Moderate 6-25 bpm  Accelerations:  At variable times  Decelerations: None  FHR Category: Category I    [place]:  Contraction Frequency (minutes): 2-5  Contraction Duration (seconds): 60-75  Contraction Quality: Strong    Membranes:  Membrane Status: Intact    Prenatal Labs:   Blood Type:   Lab Results   Component Value Date/Time    ABO Grouping O 01/16/2018 10:17 AM    ABO Grouping O 2014 06:35 AM     , D (Rh type):   Lab Results   Component Value Date/Time    Rh Factor Negative 2018 10:17 AM    Rh Factor Negative 2014 06:35 AM     , Antibody Screen:   Lab Results   Component Value Date/Time    Antibody Screen Negative 2018 10:17 AM    Antibody Screen Negative 2014 06:35 AM    , HCT/HGB:   Lab Results   Component Value Date/Time    Hematocrit 33 1 (L) 2018 10:17 AM    Hematocrit 30 6 (L) 2014 06:35 AM    Hemoglobin 11 4 (L) 2018 10:17 AM    Hemoglobin 10 2 (L) 2014 06:35 AM      , Platelets:   Lab Results   Component Value Date/Time    Platelets 861  10:17 AM    Platelets 072  06:35 AM      , 1 hour Glucola:   Lab Results   Component Value Date/Time    Glucose 78 2018 10:17 AM   , 3 hour GTT: No results found for: [id number], Rubella:   Lab Results   Component Value Date/Time    Rubella IgG Quant 41 5 2017 02:03 PM        , VDRL/RPR:   Lab Results   Component Value Date/Time    RPR SCREEN Non-Reactive (q 2014 02:38 PM    RPR Non-Reactive 2018 10:17 AM      , Hep B:   Lab Results   Component Value Date/Time    Hepatitis B Surface Ag Non-reactive 2017 02:03 PM     , HIV:   Lab Results   Component Value Date/Time    HIV-1/HIV-2 Ab Non-Reactive 2017 02:03 PM     , Group B Strep:    Lab Results   Component Value Date/Time    Strep Grp B PCR Negative for Beta Hemolytic Strep Grp B by PCR 2018 10:23 AM          Invasive Devices          No matching active lines, drains, or airways            Assessment/Plan     ASSESSMENT:   IUP at 40w5d weeks gestation in active labor  PLAN:   1) Admit   2) CBC, RPR, Blood Type   3) Analgesia and/or epidural at patient request   4) Anticipate       This patient will be an INPATIENT  and I certify the anticipated length of stay is >2 Midnights      Inocencia Karimi MD  2018  10:48 AM

## 2018-04-25 NOTE — ANESTHESIA POSTPROCEDURE EVALUATION
Post-Op Assessment Note      CV Status:  Stable    Mental Status:  Alert and awake    Hydration Status:  Euvolemic    PONV Controlled:  Controlled    Airway Patency:  Patent    Post Op Vitals Reviewed: Yes        Post-op block assessment: catheter intact and no complications        Vitals:    04/25/18 1744   BP: 139/62   Pulse: (!) 111   Resp:    Temp:    SpO2:

## 2018-04-26 VITALS
HEART RATE: 102 BPM | TEMPERATURE: 98.6 F | SYSTOLIC BLOOD PRESSURE: 114 MMHG | WEIGHT: 174 LBS | OXYGEN SATURATION: 98 % | DIASTOLIC BLOOD PRESSURE: 63 MMHG | RESPIRATION RATE: 18 BRPM | BODY MASS INDEX: 29.71 KG/M2 | HEIGHT: 64 IN

## 2018-04-26 LAB — RPR SER QL: NORMAL

## 2018-04-26 PROCEDURE — 99024 POSTOP FOLLOW-UP VISIT: CPT | Performed by: OBSTETRICS & GYNECOLOGY

## 2018-04-26 RX ORDER — IBUPROFEN 600 MG/1
600 TABLET ORAL EVERY 6 HOURS PRN
Refills: 0
Start: 2018-04-26 | End: 2018-05-22

## 2018-04-26 RX ORDER — DOCUSATE SODIUM 100 MG/1
100 CAPSULE, LIQUID FILLED ORAL 2 TIMES DAILY PRN
Refills: 0
Start: 2018-04-26 | End: 2018-05-22

## 2018-04-26 RX ORDER — MEDROXYPROGESTERONE ACETATE 150 MG/ML
150 INJECTION, SUSPENSION INTRAMUSCULAR ONCE
Status: COMPLETED | OUTPATIENT
Start: 2018-04-26 | End: 2018-04-26

## 2018-04-26 RX ORDER — ACETAMINOPHEN 325 MG/1
650 TABLET ORAL EVERY 6 HOURS PRN
Refills: 0
Start: 2018-04-26 | End: 2018-05-22

## 2018-04-26 RX ADMIN — Medication 1 TABLET: at 09:56

## 2018-04-26 RX ADMIN — Medication 325 MG: at 09:56

## 2018-04-26 RX ADMIN — MEDROXYPROGESTERONE ACETATE 150 MG: 150 INJECTION, SUSPENSION, EXTENDED RELEASE INTRAMUSCULAR at 09:53

## 2018-04-26 RX ADMIN — IBUPROFEN 600 MG: 600 TABLET ORAL at 04:16

## 2018-04-26 RX ADMIN — DOCUSATE SODIUM 100 MG: 100 CAPSULE, LIQUID FILLED ORAL at 09:56

## 2018-04-26 NOTE — DISCHARGE INSTRUCTIONS
Parto vaginal   LO QUE USTED DEBE SABER:   El parto vaginal se produce cuando el bebé nace a través de la vagina (canal del parto)  DESPUÉS DE SER DADO DE BROWN:   Medicamentos:   · Los antiiflamatorios no esteroideos (AINEs) ayudan a reducir inflamación y dolor o fiebre  Lesley medicamento esta disponible con o sin fuentes receta médica  Los AINEs podrían causar sangrado estomacal o problemas en los riñones en [employer]  Si usted esta tomando un anticoágulante, siempre  pregunte a elizabeth proveedor de nichol si los AINEs son seguros para usted  Antes de [employer], gregor siempre la etiqueta de información y siga yoon indicaciones  · Pilsen yoon medicamentos vesna se le haya indicado  Llame a elizabeth proveedor de nichol si usted piensa que elizabeth medicamento no le está ayudando o si tiene efectos secundarios  Infórmele si es alérgico a cualquier medicamento  Mantenga fuentes lista vigente de los medicamentos, vitaminas, y hierbas que irish  [address] cantidades, la frecuencia con que los irish y por qué los irish  Traiga la lista o los envases de yoon medicamentos a las citas de seguimiento  Mantenga la lista consigo en hugo de fuentes emergencia  Bote las listas [place]  Programe fuentes jusdon con elizabeth médico de cabecera:  Rosalina Ganja de las mujeres necesitan regresar donde elizabeth médico 6 semanas después de un parto vaginal  Pregunte sobre cómo cuidar de yoon heridas o suturas  Anote yoon preguntas para que recuerde hacerlas guilherme yoon citas  Actividad:  Descanse siempre que le sea posible  Trate de minimizar yoon actividades  Usted podría empezar a hacer algo de ejercicio poco tiempo después de tener a elizabeth bebé  Hable con elizabeth médico de cabecera antes de empezar a ejercitarse  Si usted trabaja fuera del hogar, pregunte también cuándo debe regresar a elizabeth trabajo  Ejercicios de Kegel:  Los ejercicios de Kegel podrían ayudar a que yoon músculos vaginales y rectales sanen más rápido   Para hacer éstos ejercicios, flexione y relaje los músculos alrededor de elizabeth vagina  Los ejercicios de Kegel fortalecen los músculos  Cuidado de los senos:  Cuando le baja la [place], yoon senos pueden sentirse llenos y duros  Pídale más información a elizabeth médico acerca de cómo cuidar yoon senos, aunque no esté amamantando a elizabeth bebé  Estreñimiento: No alvin esfuerzo para realizar fuentes evacuación intestinal si esta teniendo dificultad para hacerlo  Alimentos altos en [place], [place] líquido, y el ejercicio regular puede ayudar a prevenir estreñimiento  Ejemplos de estos alimentos incluyen, frutas y afrecho  El [facility] and [place] y agua son Indra Shelling buenos líquidos para davis  El ejercicio regular ayuda con la función de elizabeth sistema digestivo  Es posible que usted también tenga que usar fibra que se puede obtener sin [place] y laxantes  [employer] se le haya indicado  Hemorroides:  El embarazo puede causar hemorroides graves  Es posible que usted tenga dolor en el recto a causa de las hemorroides  Pregúntele acerca del cuidado de las hemorroides  Cuidado del perineo:  El perineo es el área entre la vagina y el ano  Arnkings Cruz y seca el área para ayudarla a sanar y prevenir fuentes infección  Lave el área cuidadosamente con agua y Harlin Vega se [place]  Enjuague el perineo con agua tibia cuando [place] el servicio sanitario  Elizabeth médico podría sugerirle usar un baño de asiento de agua tibia para disminuir el dolor  Un baño de asiento es cuando se llena fuentes debbi de baño o basenilla hasta el nivel de las caderas  Se recomienda sentarse en el baño de asiento de 20 a 30 minutos, o vesna [employer]  Flujo vaginal:  Usted tendrá flujo vaginal, llamado loquios, después del parto  Los loquios son de color mckeon brillante el primer o lizett día  Al cuarto día, la cantidad [place], y se torna de un color mckeon - marrón  Use fuentes toalla sanitaria en vez de un tampón para prevenir infecciones vaginales  Es normal tener loquios hasta 8 semanas después de que nazca elizabeth bebé     Períodos menstruales: Elizabeth período puede empezar de [email or web address] 7 a 12 semanas después de que nazca elizabeth bebé  Si está amamantando, puede davis [employer] para que elizabeth período regrese  Usted puede quedar embarazada de nuevo aún si no tiene elizabeth periodo menstrual  Hable con elizabeth médico de cabecera acerca de un método anticonceptivo que sea theodore para usted, si no desea quedar embarazada  Cambios en el estado de ánimo:  Muchas madres tienen algún tipo de cambio en elizabeth estado de ánimo después de misty a brandi a elizabeth bebé  Algunos de [employer] se producen debido a la falta de sueño, cambios hormonales, y el cuidado de un bebé recién nacido  Algunos cambios de ánimo pueden ser [address], vesna la depresión posparto  Hable con elizabeth médico de cabecera si se siente incapaz de cuidarse a sí misma o a elizabeth bebé  Relaciones sexuales:  Probablemente tendrá que evitar tener relaciones sexuales por 6 a 7 semanas después de celestine dado a brandi  Usted podría notar menos deseo sexual, o incluso podría sentir dolor al [employer]  Se recomienda el uso de un lubricante vaginal (gel) para ayudarle a sentirse más cómoda [employer]  Comuníquese con elizabeth médico de cabecera si:   · Usted presenta sangrado vaginal que empapa 1 toalla higiénica o más en 1 hora  · Tiene fiebre  · Elizabeth dolor no se le [place] o [place]  · La piel entre elizabeth vagina y recto está inflamada, caliente o Jose Dally  · Miri senos están inflamados, duros o dolorosos  · Se siente muy angie o deprimida  · Se siente más cansada de lo usual      · Tiene preguntas o inquietudes acerca de elizabeth condición o cuidados  Busque ayuda inmediata o llame al 911 si:   · Tiene pus o flujo amarillo saliendo de la vagina o herida  · Está orinando muy poquito o no orina del todo  · Miri brazos o piernas se sienten calientes, sensibles y dolorosos  Podrían verse inflamados y rojos  · Siente desvanecimiento, tiene dolor de pecho repentino o peor o dificultad para respirar   Duke Gist podría sentir más dolor cuando respira profundo o tose o podría toser tabby  © 2014 [address] is for End User's use only and may not be sold, redistributed or otherwise used for commercial purposes  All illustrations and images included in CareNotes® are the copyrighted property of A D A M , Inc  or Charles Chavira  Esta información es sólo para uso en educación  Agrrett intención no es darle un consejo médico sobre enfermedades o tratamientos  Colsulte con garrett Ladean Artist farmacéutico antes de seguir cualquier régimen médico para saber si es seguro y efectivo para usted  Cuidado de los senos para las madres lactantes   LO QUE USTED DEBE SABER:   Miri senos experimentarán ciertos cambios normales mientras amamanta a garrett bebé  A veces surgen problemas con los senos y los pezones guilherme la lactancia  Entérese de qué cambios son normales y cuáles podrían ser problemáticos  El cuidado de miri senos puede ayudarle a evitar y mantener bajo control ciertos problemas, de modo que tanto usted vesna garrett bebé disfruten de los beneficios de la lactancia materna  DESPUÉS DE SER DADO DE BROWN:   Cambios en los senos guilherme la lactancia materna:   · Guilherme los primeros días posteriores al nacimiento del bebé, garrett cuerpo producirá fuentes pequeña cantidad de [place] materna, llamada calostro  Garrett cuerpo comenzará a producir la [employer] dentro de 2 a 5 días  Es posible que la leche madura tarde hasta 10 días en bajar  Cuando baje la [employer], miri senos se pondrán llenos y firmes  Es posible que estén un poco doloridos  · Miri senos dejarán de sentirse llenos cuando amamante al bebé  Es posible que sienta un hormigueo cuando sale la leche de los senos   Esta sensación se conoce vesna el reflejo de bajada de [place]  Pasados 7 o más días, es posible que no sienta los senos tan llenos  Miri pezones se deberían de kaila iguales que antes de comenzar a amamantar   Es buena señal si siente los senos llenos antes de amamantar y vacíos después de alimentar al bebé  Problemas que puede tener con los senos mientras esta lactando:   · Dolor en los pezones  puede ocurrir cuando comience a amamantar a elizabeth bebé  Es normal sentir un poco de [employer]  Es posible que le duelan los pezones si el bebé no se prende correctamente del pecho  El hecho de colocar al bebé en la posición correcta para que se prenda caden del pecho pueden eliminar o aliviar el dolor en los pezones  Pida a elizabeth médico que le enseñe a hacer que elizabeth bebé se prenda correctamente del pecho  También le puede ser de beneficio colocar fuentes compresa tibia en yoon pezones para ayudar a reducir el dolor  · Conductos lactíferos obstruidos  podría causar que se diana bultos dolorosos en los senos  Es posible que los conductos lactíferos se obstruyan si los senos no se vacían por completo cuando amamanta al bebé  Edward Mingle y apriete suavemente yoon senos cuando el bebé tome un descanso de amamantar  Un masaje suave puede destapar un conducto obstruido  Extraiga la [employer] quede en yoon senos fuentes vez que el bebé termine de amamantar  Evite usar blusas o corpiños con armazón de alambre Vidal's senos pues estos podrían poner demasiada presión en yoon senos  · La congestión mamaria  ocurre cuando la leche baja después de que empiece a amamantar  La congestión mamaria puede causar que yoon senos se inflamen y duelan  Puede también que esto suceda si el bebé se saltea fuentes comida o si no le da el pecho cuando el bebé se lo pide  La mejor manera de combatir los síntomas de la ingurgitación [place] es misty de comer al bebé a menudo para vaciar los senos  Puede que al bebé le resulte difícil prenderse de los senos cuando están congestionados  Si esto sucede, exprima fuentes pequeña cantidad de [place] y después alvin que el bebé se prenda del pecho   Puede colocarse compresas frías, paquetes de gel o de hielo [employer] senos para aliviar el dolor y la inflamación  Pregunte a elizabeth médico la frecuencia y cantidad de tiempo que usted debería usar las compresas downing o bolsa de hielo  · Fuentes infeccion mamaria llamada mastitis  podría desarrollarse si usted tiene fuentes obstrucción en los conductos mamarios o fuentes congestión  [place] resultado de la mastitis, los senos se enrojecen, se inflaman y duelen  Es posible que también presente síntomas similares a los de la gripe, vesna escalofríos y [place]  Colóquese calor sobre los senos para aliviar el dolor  Puede usar un paño húmedo caliente sobre el seno dolorido o sobre ambos senos  Pregunte a elizabeth médico con qué frecuencia debería de hacer esto  Es posible que elizabeth médico le sugiera que tome un medicamento antiinflamatorio no esteroideo, vesna ibuprofeno, para aliviar el dolor y bajar la inflamación  Puede que elizabeth médico de cabecera también le recete un antibiótico para el tratamiento de la mastitis  Pregunte a elizabeth médico si debería amamantar al bebé cuando tiene fuentes infección [place]  Cómo puede contribuir a prevenir o controlar los problemas con los senos mientras estoy lactando:   · Aprenda a colocar al bebé para que se prenda del pecho correctamente  Para que el bebé se prenda correctamente, asegúrese de que elizabeth boca cubra la mayor parte de la areola (la parte oscura que rodea al pezón)  No debería prenderse solamente del pezón  El bebé está en la posición correcta si usted se siente a gusto y no siente dolor  Cuando el maikel se prende correctamente el puede recibir suficiente leche y esto también puede prevenir dolor en los pezones y otros problemas con los senos  Hay varias posiciones para amamantar que usted [employer]  Escoja la posición que funciona mejor para usted y se bebé  Solicite mas información a elizabeth medico sobre vesna sujetar y amamantar a elizabeth bebé  · Evite que el bebé la [place]  Es posible que el bebé comience a cortar los dientes a los 3 o 4 meses de edad   Para evitar que la [place], desprenda la succión Frank [place]ar ve que haya acabado de amamantar o si se queda dormido  Para desprender la succión coloco elizabeth dedo en la esquina de elizabeth boca  Si la muerde, reaccione con sorpresa o disgusto  Anímelo cuando no la muerda  · Amamante a elizabeth bebé de forma regular  Alimente al bebé de 8 a 12 veces al día  Es posible que deba despertarlo para darle de comer guilherme la noche  Puede amamantarlo con 1 o ambos senos cada vez  El bebé debería alimentarse por igual de los [address] senos a lo jacob del día  Si el bebé sólo irish leche de 1 seno fuentes vez, ofrézcale el otro seno keli la próxima vez que lo amamante  · Programe y Vidal Goodrich a todas miri citas de seguimiento  Hable con el médico del bebé o con elizabeth médico guilherme las visitas de seguimiento si está teniendo problemas con miri senos  Es posible que el [place] sugiera que V[place], [place] o con elizabeth kristal, a clases de lactancia materna  También podría ser buena idea participar en un santos de apoyo para madres lactantes  El médico puede sugerirle que consulte a un especialista en lactancia  Se trata de un médico que puede ayudarle con la lactancia materna  Comuníquese con elizabeth médico de cabecera si:   · Tiene fiebre o escalofríos  · Usted tiene rishi en el cuerpo y siente que no tiene suficiente energía  · Wilber o ambos senos están rojos, inflamados o duros, doloridos y se sienten tibios o calientes  · Tiene congestión mamaria que no mejora dentro de 24 horas  · Ve o siente un bulto en elizabeth seno que le duele al tocarlo  · Le duelen los pezones cuando amamanta o entre sesiones de lactancia  · Miri pezones están rojos, secos, agrietados, sangran o tienen costras  · Tiene alguna pregunta o inquietud acerca de elizabeth condición o cuidado  © 2014 [address] is for End User's use only and may not be sold, redistributed or otherwise used for commercial purposes   All illustrations and images included in CareNotes® are the copyrighted property of [employer]ORA [employer]HT[employer] Inc  or Charles Cait  Esta información es sólo para uso en educación  Elizabeth intención no es darle un consejo médico sobre enfermedades o tratamientos  Colsulte con elizabeth [place] farmacéutico antes de seguir cualquier régimen médico para saber si es seguro y efectivo para usted  Gloria Dibbles materna y elizabeth dieta   LO QUE NECESITA SABER:   [place] saludable es un plan de comidas que le proporciona la cantidad de calorías y nutrientes necesarios mientras amamanta a elizabeth bebé  Elizabeth cuerpo requiere calorías y nutrientes adicionales para que se mantenga saludable y para apoyar la producción de [employer]  Fuentes dieta saludable incluye fuentes variedad de alimentos de todos los grupos alimenticios  INSTRUCCIONES SOBRE EL BROWN HOSPITALARIA:   Llame al 911 en hugo de presentar lo siguiente:   · Elizabeth bebé tiene dificultad para respirar guilherme o después de lactar  Pregúntele a elizabeth Sherol Mince vitaminas y minerales son adecuados para usted  · Usted pierde peso muy rápido  · Usted está siguiendo fuentes dieta especial y carmine que necesita un suplemento  · Usted tiene signos de deshidratación, vesna orina de color oscuro o disminución de la [place]  Comuníquese con el médico de elizabeth bebé si:   · Elizabeth bebé tiene un sarpullido o cambios en elizabeth piel  · Elizabeth bebé vomita fuentes cantidad jonathan después de que lo [place]  · Elizabeth bebé tiene evacuaciones intestinales verdes con tabby o mucosidad  · Elizabeth bebé tiene 4 o más días de nacido y [employer] de 6 pañales cada día  · El bebé tiene 4 o más días de nacido y tiene menos de 3 a 4 evacuaciones intestinales al día  Pautas a seguir guilherme la lactancia materna:   · Aumente la cantidad de calorías  Guilherme la lactancia elizabeth cuerpo necesita aproximadamente 500 calorías adicionales por día  Puede tratar de comer comidas más pequeñas más a menudo a lo jacob del día para satisfacer yoon necesidades   Un dietista puede indicarle cuántas calorías necesita comer en total cada día y ayudarle a elaborar un plan saludable de comidas  · [place] suficiente líquidos  Debe davis entre 8 y 12 vasos de líquido al día para no deshidratarse y mantener elizabeth producción de leche  Darcy algo cada vez que amamante para facilitar que reciba suficiente líquido  Darcy líquidos que no contengan cafeína, vesna [place], [facility] y [place]  · Consuma suficiente calcio y vitamina D  Las [employer] de 19 años de edad o mayores necesitan 1000 mg  de calcio al día  Las [employer] de 18 años de edad o menores necesitan 1300 mg  de calcio al día  Los productos lácteos, vesna la [place], el yogur y [place], contienen calcio  La brandi solar ayuda a elizabeth cuerpo a producir vitamina D  Las [employer] no se exponen mucho al sol pueden tener niveles bajos de vitamina D  Mientras usted esté amamantando, debe recibir 5 microgramos de vitamina D cada día  El aceite de pescado es fuentes buena stephanie de vitamina D  La leche y otros alimentos también están fortificados con vitamina D  Consulte con elizabeth médico acerca de los suplementos si no ingiere alimentos con calcio o si no recibe mucha brandi solar  · Consuma suficientes nutrientes si sigue fuentes dieta especial   Es posible que las mujeres que no comen ciertos alimentos no reciban suficiente cantidad de algunos nutrientes  Por ejemplo, las mujeres que siguen dietas vegetarianas estrictas no comen productos de origen animal, vesna carne, huevos y productos lácteos  Los productos de origen animal proporcionan importantes nutrientes, vesna proteína y vitamina B12  Es posible que usted no pueda consumir productos lácteos si usted es intolerante a la lactosa  Los productos lácteos son Iliana Lint stephanie vignesh de calcio y vitamina D  Hable con elizabeth médico si sigue fuentes Ivon Gunnels especial y carmine que puede necesitar un suplemento  Efectos de los alimentos en elizabeth bebé:  Por lo general, los alimentos que usted ingiere no afectan a elizabeth bebé   La mayor parte de las mujeres encuentran que pueden comer cualquier tipo de alimento sin que esto le cause problemas a elizabeth bebé  Si piensa que algo que comió afectó a elizabeth bebé, no consuma el alimento que podría celestine provocado el problema  Hable con el médico de elizabeth bebé si carmine que elizabeth él tiene [place] o susceptibilidad a algún alimento  Puede amamantar a elizabeth bebé, incluso si él tiene alergia a algún alimento  Lo que debe evitar o limitar mientras está amamantando:   · Limite o evite el consumo de alcohol  El alcohol pasa al bebé por la [place]  Si usted opta por davis alcohol, raa de mamar a elizabeth bebé antes de davis alcohol  No amamante a elizabeth bebé por lo menos por 2 horas después de davis 1 bebida  Fuentes bebida de alcohol equivale a 12 onzas de cerveza, 4 onzas de vino o 1½ onzas de licor  · Limite el consumo de cafeína  La cafeína puede transmitirse por medio de la [place]  La cafeína se encuentra en el café, el té, algunos refrescos, las bebidas energéticas y el chocolate  Pregunte a elizabeth médico cuánta cafeína puede consumir por día  · Pregunte acerca de los tés y los suplementos herbales  Es posible que deba limitar o evitar ciertos tés o suplementos herbales  Consulte con elizabeth médico qué tipos de tés y suplementos herbales son seguros para davis  · Limite la cantidad de pescado que contiene janna  Cristina todos los peces y mariscos contienen janna  Coma un saleem de 12 onzas a la semana de pescado o [employer] tengan un nivel bajo de janna  Entre éstos se [employer], el Jeeri [employer] agua, el salmón, el abadejo y Henry Linus In Pedriolo  Coma sólo 6 onzas de atún devlin por semana  El atún albacora tiene más janna que el atún [place]  No  coma tiburón, pez elsa, caballa ni blanquillo  Pérdida de peso guilherme la lactancia:  Es posible que baje de peso en forma natural si sigue fuentes dieta saludable mientras está amamantando  [place] podría suceder porque elizabeth cuerpo necesita más calorías guilherme la lactancia   No todas las mujeres bajan de peso cuando [place]  Si desea adelgazar, no reduzca demasiado la cantidad de calorías que consume ni pierda peso demasiado rápido  [place] podría provocar que baje garrett producción de [place] o que baje el nivel de ciertos nutrientes  La mayoría de las mujeres pueden perder hasta 2 libras (1 kg ) de peso por semana, sin afectar garrett producción de leche o Charles Chavira  Para más información acerca de la lactancia materna:   · [facility]  [address] [facility] [place]  Phone: [phone]  Phone: [phone]  Web Address: [email or web address]  Acuda a yoon consultas de control con garrett médico según le indicaron  Anote yoon preguntas para que se acuerde de hacerlas guilherme yoon visitas  © 2017 [address] [facility] Information is for End User's use only and may not be sold, redistributed or otherwise used for commercial purposes  All illustrations and images included in CareNotes® are the copyrighted property of A D A M , Inc  or Charles Chavira  Esta información es sólo para uso en educación  Garrett intención no es darle un consejo médico sobre enfermedades o tratamientos  Colsulte con garrett Edwina Every farmacéutico antes de seguir cualquier régimen médico para saber si es seguro y efectivo para usted  Johnathan Citiellis a garrett bebé   LO QUE NECESITA SABER:   La lactancia materna vinny beneficios para garrett bebé y para usted  Los expertos recomiendan que usted alimente sólo con [employer] [place] a garrett bebé hasta que tenga 6 meses de sebastián  La lactancia materna [facility] primeros 6 meses puede reducir el riesgo de que garrett bebé padezca enfermedades  Estas enfermedades incluyen las infecciones respiratorias (en los pulmones), alergias, asma y problemas estomacales  Los expertos además recomiendan que usted siga amamantado a garrett bebé hasta que tenga 12 meses de sebastián por lo menos aún después que empiece a comer alimentos sólidos   Usted puede amamantar a elizabeth bebé por más tiempo si así lo desea  INSTRUCCIONES SOBRE EL BROWN HOSPITALARIA:   Busque atención médica de inmediato si:   · Usted se siente muy deprimida o tiene pensamientos de hacerle daño a elizabeth bebé  Pregúntele a elizabeth Sydni Yaakov vitaminas y minerales son adecuados para usted  · Elizabeth bebé se alimenta menos de 8 veces al día  · Elizabeth bebé tiene 4 o más días de nacido y [employer] de 6 pañales cada día  · El bebé tiene 4 o más días de nacido y tiene menos de 3 a 4 evacuaciones intestinales al día  · A usted le duelen los pezones cuando amamanta o entre las sesiones de lactancia  · Miri pezones se jamie enrojecidos, secos, agrietados o presentan costras  · Usted siente un bulto en el pecho que se encuentra sensible  · Miri senos se vuelven dolorosos e inflamados  · Elizabeth bebé se pone ictérico (elizabeth piel y la parte kirk de miri ojos se torna amarillenta)  Acuda a miri consultas de control con elizabeth médico según le indicaron  Anote miri preguntas para que se acuerde de hacerlas guilherme miri visitas  Los beneficios de la lactancia para elizabeth bebé   · La leche materna le vinny a elizabeth bebé la mejor nutrición  Miri senos keli producirán calostro  El calostro es rico en anticuerpos (proteínas que protegen el sistema inmunológico de elizabeth bebé)  La leche materna empieza a reemplazar al calostro de 2 a 4 días después de nacido elizabeth bebé  [address] proteínas, grasas, azúcares, vitaminas y minerales que elizabeth bebé necesita para crecer  Elizabeth bebé requerirá un suplemento de vitamina D después de nacer  Consulte con el médico sobre la cantidad y tipo de suplemento de vitamina D que es el mejor para elizabeth bebé  · [facility] y fácil de digerir para elizabeth bebé  La leche materna no requiere preparación  · La leche materna sirve para que elizabeth bebé desarrolle un sistema inmunológico alphonse  El sistema inmunológico ayuda a combatir infecciones   La leche materna contiene anticuerpos y otras sustancias que ayudan a proteger el sistema inmunológico de elizabeth bebé  La leche materna además sirve para proteger a elizabeth bebé contra alergias e infecciones  Los bebés [place] tienen douglas riesgo de problemas de alergias vesna eczema  El eczema provoca enrojecimiento, picazón e inflamación en la piel  La leche materna también puede facilitar la protección de elizabeth bebé contra las infecciones de oído, [place] e infecciones pulmonares  · La leche materna reduce los riesgos de elizabeth bebé de sufrir algunas condiciones médicas  Los bebés [place] podrían correr Renaldo Mireles riesgo del síndrome de muerte súbita del lactante (SMSL)  También corren Renaldo Mireles riesgo de ser obesos o desarrollar diabetes, colesterol alto o presión arterial ham  Los beneficios de la lactancia para usted:   · Amamantar puede facilitar elizabeth recuperación después del alumbramiento  La lactancia materna magnus después del nacimiento de elizabeth bebé ayuda a detener el sangrado de elizabeth útero  También ayuda a reducir elizabeth útero al tamaño que tenía antes del [facility]  Si usted sigue fuentes dieta [address] es posible que baje de [place]  [place] sucede debido al exceso de calorías que elizabeth cuerpo necesita para amamantar  · La lactancia podría reducir el riesgo de sufrir depresión postparto y cierto tipo de [place]  La lactancia materna podría reducir elizabeth riesgo de depresión postparto y cáncer de mama y de ovario  Amamantar también reduce elizabeth riesgo de diabetes tipo 2 si usted no sufrió de diabetes gestacional guilherme elizabeth embarazo  La lactancia puede fortalecer yoon huesos  [place] puede ayudar a prevenir la osteoporosis y futuras fracturas  · Amamantar tiene otros beneficios  Amamantar es fuentes experiencia especial y es fuentes buena manera de empezar a crear un vínculo con elizabeth bebé  La lactancia materna puede ahorrarle tiempo y dinero porque usted no tiene que comprar y preparar la leche    Con qué frecuencia debe amamantar a elizabeth maikel:   · Es posible que elizabteh bebé le deje saber cuando él esté listo para ser Volney Moan  Es posible que esté más despierto y se [place]  Es posible que se ponga las timothy en la boca  El llanto es normalmente fuentes señal tardía de que elizabeth bebé tiene [place]  Usted debe amamantar a elizabeth bebé entre 8 y 15 veces al día  [place] incluye despertarse para amamantarlo guilherme la noche  Si elizabeth bebé está durmiendo y es hora de [address], pase elizabeth dedo suavemente sobre los labios de elizabeth bebé  · Puede amamantar a elizabeth bebé por unos 15 a 20 minutos en cada pecho  Algunos bebés se alimentan guilherme fuentes cantidad de [employer] o [place]ario [place]  Deje que elizabeth bebé se alimente de cada pecho hasta que se detenga por elizabeth propia cuenta  La lactancia en un bebé prematuro:   · Algunos bebés prematuros son incapaces de comer por elizabeth propia cuenta y requieren alimentación a través de fuentes sonda  Aún si elizabeth bebé prematuro no es capaz de comer directamente de elizabeth pecho, todavía es posible proporcionarle [place]  Se puede extraer la [place] con elizabeth mano o con un extractor de [place] y después alimentar a elizabeth bebé con un biberón  A medida que elizabeth bebé crezca y se desarrolle, podría aprender a amamantar  Audrene Jaz que nazca elizabeth bebé, es importante extraer la [place] para que pueda recibir los anticuerpos del calostro  Al extraer la [place] de yoon senos, se estimulan para que produzcan [address]  · La leche materna es especialmente beneficiosa para los bebés prematuros que tienen un peso muy bajo al nacer  Los bebés prematuros corren riesgo de sufrir problemas médicos debido a que elizabeth sistema inmunológico no está completamente formado  Los anticuerpos y nutrientes que se encuentran en el calostro y la [place] materna pueden ayudar a proteger a un bebé prematuro contra problemas médicos  La leche materna facilita el desarrollo de los ojos, cerebro y sistema digestivo de elizabeth bebé    Cuándo no debe amamantar a elizabeth bebé:   · Elizabeth bebé sufre de galactosemía, fuentes condición que impide que elizabeth cuerpo Bear Agueda galactosa (un tipo de azúcar que se encuentra en la Charlies Solum)  · Usted sufre de tuberculosis activa (TB) que no ha sido tratada guilherme un mínimo de 2 semanas  · Usted sufre del [place]  · Usted [place] drogas ilegales o irish alcohol con frecuencia o en cantidades grandes  Evitando problemas con la lactancia:   · [place] con elizabeth médico o con un especialista en lactancia  Anote las preguntas o inquietudes que tenga acerca de la lactancia para que recuerde hacerlas guilherme yoon citas  · Pregunte [employer]  Consulte con elizabeth médico antes de davis cualquier medicamento  [place] incluye todos los [place] de prescripción y los de [place]  Algunos medicamentos podrían reducir la cantidad de [employer] usted produce  Es posible que otros medicamentos pasen a elizabeth Charlies Solum y por lo tanto afecten a elizabeth bebé  · No fume  La nicotina se transmite a elizabeth Charlies Solum  Elizabeth bebé está expuesto a éstos químicos por medio de la Charlies Solum y la inhalación del humo del cigarrillo  Fumar también puede reducir la producción de Charlies Solum  No use cigarrillos electrónicos o tabaco sin humo en vez de cigarrillos o para tratar de dejar de fumar  Todos estos aún contienen nicotina  Pida a elizabeth médico información si usted fuma actualmente y [place] para dejar de hacerlo  · Limite o no consuma bebidas alcohólicas  El alcohol pasa al bebé por la Charlies Solum  Si usted elige beber alcohol, amamante a elizabeth bebé antes de beberlo  No amamante a elizabeth bebé por lo menos por 2 horas después de davis 1 bebida  Mavis bebida de alcohol equivale a 12 onzas de cerveza, 4 onzas de vino o 1½ onzas de licor  Cuídese mientras usted está amamantando:   · Descanse lo suficiente  Es posible que le resulte difícil descansar mientras está cuidando de elizabeth bebé recién nacido  Pida ayuda a yoon familiares y amigos para obtener el descanso que usted necesita  · Consuma alimentos saludables    Un plan de comida saludable puede ayudarle a garrett cuerpo a producir suficiente [place]  Usted necesita consumir calorías adicionales cada día mientras esté amamantando  Es posible que garrett médico le indique davis vitaminas, incluyendo vitaminas para el embarazo y vitamina D  Consulte con él antes de davis cualquier vitamina o suplemento  · Controle el estrés  Un aumento del estrés puede disminuir la cantidad de [place] materna que usted produce  La relajación puede ayudarle a disminuir el estrés y a sentirse mejor  La respiración profunda, la meditación y escuchar música también pueden ayudarle a lidiar con el estrés  Hable con garrett médico acerca de otras formas de controlar el estrés  Para recibir [de-identified] y más información acerca de la lactancia materna:   · American Academy [employer]  [address], [place] [address]  Phone: [phone]  Web Address: http://NantMobile/  · [address]  Phone: [phone]  Phone: [phone]  Web Address: [email or web address] rodriguez [email or web address]  org  © 2017 [facility] Information is for End User's use only and may not be sold, redistributed or otherwise used for commercial purposes  All illustrations and images included in CareNotes® are the copyrighted property of A D A M , Inc  or Charles Chavira  Esta información es sólo para uso en educación  Garrett intención no es darle un consejo médico sobre enfermedades o tratamientos  Colsulte con garrett Geeta Perry farmacéutico antes de seguir cualquier régimen médico para saber si es seguro y efectivo para usted  Cuidado perineal posparto   LO QUE NECESITA SABER:   El cuidado perineal posparto es el cuidado de garrett periné después de misty a brandi  El periné se compone de la vagina y el ano     INSTRUCCIONES SOBRE EL [facility] HOSPITALARIA:   Cuidado de garrett perineo:  Los [employer] dan fuentes botella pequeña con atomizador y le mostrarán cómo usarla  Geni lo siguiente después de usar el inodoro y antes de colocarse fuentes nueva almohadilla:  · Retire la toalla sucia  · Utilice la botella con atomizador para enjuagar elizabeth periné de la parte frontal a la parte posterior mientras se sienta en el inodoro  · Seque el área ligeramente de la parte frontal a la parte posterior con papel de baño o un paño de algodón  · Coloque fuentes toalla higiénica limpia  · Lávese las timothy  Reducción de dolor:  Pregúntele a elizabeth médico acerca de estas y otras maneras de reducir el dolor perineal:  · Baño de asiento:  Es probable que los [employer] Inc den un baño de asiento portátil  Esta es fuentes pequeña debbi que cabe en el inodoro  Llene el baño de asiento o la debbi con 10 a 15 cm (4 a 6 pulgadas) de agua tibia  Siéntese en el agua tibia guilherme 20 minutos, 2 a 3 veces al día  · Hielo:  El hielo ayuda a disminuir la inflamación y el dolor  El hielo también puede contribuir a evitar el daño de los tejidos  Use un paquete de hielo o ponga hielo molido dentro de [employer]  Cúbrela con fuentes toalla y colóquela sobre elizabeth periné por 15 a 20 minutos cada hora o vesna se le indica  · Aerosol, toallitas húmedas o toallas higiénicas medicadas:  Los médicos podrían darle un aerosol o toallitas húmedas que contienen medicamento adormecedor para reducir el dolor  Las toallas higiénicas que contienen fuentes hierba conocida vesna sherice escocés podrían ayudar a reducir el dolor también  Utilice estas toallas después de realizar elizabeth cuidado perineal o baño de asiento  Acuda a yoon consultas de control con elizabeth médico según le indicaron  Anote yoon preguntas para que se acuerde de hacerlas guilherme yoon visitas  Pregúntele a elizabeth [place]ilar Shove vitaminas y minerales son adecuados para usted  · Usted presenta sangrado vaginal que empapa 1 toalla higiénica o más en 1 hora  · Usted tiene fuentes secreción vaginal de [employer]  · Usted se siente débil o aturdido      · Usted tiene preguntas o inquietudes acerca de garrett condición o cuidado  Busque atención médica de inmediato o llame al 911 si:   · Usted tiene coágulos grandes de tabby o tabby brendan brillante proveniente de garrett vagina  · Usted tiene dolor abdominal, vómitos y [place]  © 2017 2600 Abdon Sage Information is for End User's use only and may not be sold, redistributed or otherwise used for commercial purposes  All illustrations and images included in CareNotes® are the copyrighted property of A D A M , Inc  or Charles Chavira  Esta información es sólo para uso en educación  Garrett intención no es darle un consejo médico sobre enfermedades o tratamientos  Colsulte con garrett Edwina Every farmacéutico antes de seguir cualquier régimen médico para saber si es seguro y efectivo para usted  Cuidado personal después del parto   CUIDADO AMBULATORIO:   El periodo postparto  es el periodo de tiempo desde el momento de misty a brandi hasta por lo menos 6 semanas  Guilherme elida tiempo usted puede experimentar muchos cambios físicos vesna emocionales  Es muy importante entender que es lo normal y cuándo es necesario llamar a garrett médico  También es importante saber vesna cuidarse a sí misma guilherme elida periodo  Llame al 911 en hugo de presentar lo siguiente:   · Usted empapa fuentes toalla higiénica en 15 minutos, tiene visión borrosa, piel húmeda o pálida y siente que se va a desmayar  · Usted se desmaya o pierde el conocimiento  · Garrett corazón está latiendo más rápido de lo normal      · Usted tiene dificultad para respirar  · Usted expectora tabby  Busque atención médica de inmediato si:   · Usted empapa 1 o más toallas higiénicas en 1 hora, o de garrett vagina le salen unos coágulos de tabby más grandes que fuentes moneda de 25 [address]  · Garrett pierna está Noralee Brightly y [place] jonathan de lo normal      · Usted tiene dolor abdominal intenso  · Usted tiene un alphonse dolor de cyn o cambios en garrett visión  · La incisión de la episiotomía o de la cesárea está brendan, inflamada, sangrando o supurando pus  · La incisión se abre  · Usted ve o escucha cosas que no existen o tiene pensamientos de [place] daño a sí misma o de lastimar a elizabeth bebé  Comuníquese con elizabeth obstetra o la partera sí:   · Usted tiene fiebre  · Usted le comienza o se le empeora el dolor en elizabeth abdomen o vagina  · Usted sigue con tristeza de maternidad 10 zee después del parto  · Usted tiene dificultad para dormir  · Usted tiene flujo vaginal con mal olor  · Usted tiene dolor o ardor al orinar  · Usted no tiene evacuaciones intestinales por 3 días o más  · Usted tiene náuseas o está vomitando  · Usted tiene unos bultos duros o ashley neri por encima de yoon senos  · Usted tiene los pezones cuarteados o le están sangrando  · Usted tiene preguntas o inquietudes acerca de elizabeth condición o cuidado  Cambios físicos:  A continuación están los cambios normales después de misty a brandi:  · Dolor en el área entre el ano y la vagina    · Dolor en el pecho    · Estreñimiento o hemorroides    · Golpes de calor o frío    · Sangrado o secreción vaginal    · Cólicos abdominales de leves a moderados    · Dificultad para controlar las deposiciones o la orina  Cambios emocionales:  Los siguientes son los síntomas de la tristeza de maternidad o de las emociones normales después de misty a brandi  El cambio en yoon emociones puede ser causado por un bajón en los niveles hormonales después del [place]  Si estos síntomas velez más de 1 a 2 [address] de misty a brandi, usted podría tener depresión posparto  · Sentirse irritable    · Sentimiento de tristeza    · Llora sin razón    · Sentimiento de ansiedad  Cuidado de los senos para mamás lactantes:  Usted puede tener los senos adoloridos guilherme 3 a 6 días después de misty a brandi  [place] sucede a medida que la [place] a llenar yoon senos   Es posible que también tenga los senos adoloridos en hugo que usted no esté dando de lactar a elizabeth bebé con frecuencia  Geni lo siguiente para cuidar de yoon senos:  · Aplique fuentes compresa húmeda y tibia en yoon senos según las indicaciones  [place] puede servir para calmar el dolor en yoon senos  Asegúrese que el paño no esté muy caliente antes de colocarlo en elizabeth pecho  · Alimente al bebé o sáquese leche con frecuencia  [place] puede prevenir la congestión de los conductos de [place]  Pregunte a elizabeth médico con qué frecuencia debe alimentar a elizabeth bebé o sacarse leche  · Dese masajes en los senos según las indicaciones  [place] puede ayudar a aumentar el flujo de [place]  [place] con suavidad de forma circular los senos antes de la lactancia  Es posible que necesite apretar con suavidad elizabeth pecho o pezón para ayudar a que salga la leche  Usted también puede usar un extractor de [place] para ayudar a liberar la leche de yoon senos  · Lávese los senos sólo con agua tibia  No use jabón en yoon pezones  El jabón puede [employer]  · Aplique crema de lanolina en yoon pezones según las indicaciones  La crema de lanonila puede servir para humectar elizabeth piel y evitar que los pezones se resequen  Siempre  debe quitarse la crema de lanolina con agua tibia antes de darle pecho a elizabeth bebé  · Use protectores para la lactancia en elizabeth brasier o sostén  La [employer] salir de yoon pezones cuando usted no está dando de lactar  Usted se puede colocar los protectores dentro de elizabeth brasier para evitar que la [place] se traspase a elizabeth ropa  Pregunte a elizabeth médico sobre donde puede [employer] protectores para lactancia  · Solicite asistencia para la lactancia materna si lo necesita  Existen médicos que le pueden contestar las preguntas sobre la [place] Bijou y brindar [de-identified]  Pregunte a elizabeth médico con quién puede asesorarse si necesita asistencia con la lactancia    El cuidado de los senos para las madres que dan biberón con formula:  La leche materna llenará yoon pechos incluso si usted alimenta a elizabeth bebé con leche de formula  A continuación unas cosas que puede hacer que ayudan a que deje de producir [employer] y que yoon senos se llenen y le provoque dolor:  · Use un sostén o brasier de soporte en todo momento  Un brasier deportivo o un sostén Megan Lucas puede ayudar a suspender la producción de [employer]  · Aplique hielo en cada seno por 15 a 20 minutos cada hora según las indicaciones  Use un paquete de hielo o ponga hielo molido dentro de [employer]  Cúbrala con fuentes toalla  El hielo ayuda a encoger los conductos de [employer]  · Evite que le caiga agua tibia en yoon senos  El agua tibia hará que sea más fácil que la leche llene yoon pechos  El la ducha póngase de espaldas al agua  · Limite la cantidad de tiempo que toca yoon senos  [place] evitará que los senos se llenen de [employer]  Cuidado del perineo:  El perineo es el área entre el recto y la vagina  Es normal tener inflamación y dolor en esta área después de misty a brandi  Si a usted le hicieron fuentes episiotomía, elizabeth médico le proporcionará instrucciones especiales  · Limpie el perineo después de ir al baño  [place] puede prevenir fuentes infección y [de-identified] para la cicatrización  Use fuentes botella de agua tibia con atomizador para limpiar el perineo  También puede rociar suavemente agua tibia contra el perineo mientras orina  Siempre debe limpiarse de adelante hacia atrás  · [address]  Un baño de asiento puede servir para aliviar la inflamación y el dolor  Llene la debbi o un recipiente con agua hasta que [place] yoon caderas y siéntese en el agua  Use agua fría por 2 días después del parto  Luego use agua tibia  Pregunte a elizabeth médico por más [employer] chai de [place]  · Aplique compresas de hielo por las primeras 24 horas o [address] indicaciones  Use un guante médico y llénelo con hielo o compre compresas de hielo  Envuelva la compresa de hielo o el guante en fuentes toalla o paño pequeño   Coloque la compresa de hielo en el perineo por 20 minutos cada vez  · Siéntese en un cojín en forma de najma  [place] puede ayudar [employer] presión que se ejerce en elizabeth perineo cuando se sienta  · Use toallitas medicadas o tome pastillas según las indicaciones  Elizabeth médico puede indicarle que use toallitas de hamamelis  Usted puede colocar las toallitas de hamamelis en el refrigerados antes de aplicarlas en el perineo  También le puede indicar que tome un analgésico antiinflamatorio  Pregunte a elizabeth médico con que frecuencia usar las pastillas o usar las toallitas con medicamento  · No vaya a nadar ni tome chai en debbi por 4 a 6 semanas o según las indicaciones  [place] servirá para evitar fuentes infección en elizabeth útero o vagina   El cuidado intestinal y de la vejiga:  Brandon Urbina puede davis entre 3 a 5 días para tener fuentes evacuación intestinal después de misty a brandi a elizabteh bebé  Usted puede hacer lo siguiente para prevenir o controlar el estreñimiento y tener el control de yoon intestinos o vejiga:  · [facility] indicaciones  Un ablandador fecal es un medicamento que hará que yoon movimiento intestinal de materia fecal [address]  [place] puede prevenir o aliviar el estreñimiento  Un ablandador fecal además puede hacer que la evacuación intestinal duela menos  · [place] suficiente líquidos  Pregunte cuánto líquido debe davis cada día y cuáles líquidos son los más adecuados para usted  Los líquidos pueden ayudar a prevenir el estreñimiento  · [place] alimentos ricos en fibras  [address] frutas, verduras, granos, frijoles y lentejas  Pregunte a elizabeth médico cuál es la cantidad de fibra que necesita al día  La fibra puede prevenir el estreñimiento  · Geni los ejercicios de Kegel según las indicaciones  Los ejercicios de [employer] sirven para fortalecer los músculos que [employer] movimientos intestinales y la [place]  [place] a elizabeth médico más [employer] ejercicios de Kegel       · Aplique fuentes compresa o medicamento para la hemorroides según las indicaciones  [place] puede aliviar la inflamación y el dolor  Elizabeth médico le puede indicar que use hielo o pañitos medicados para la hemorroides  También le puede indicar que se alvin chai tibios de [place]  Pregunte a elizabeth médico por mayor información sobre cómo controlar la hemorroides  Nutrición:  Anand Dinning buena nutrición es importante en el periodo posparto  La nutrición ayuda a que usted regrese a elizabeth peso saludable, aumenta el nivel de energía y davis el estreñimiento  También sirve para que [employer] suficientes nutrientes y calorías si usted va a alimentar a elizabeth maikel con Todd InflaRx  · Consuma alimentos saludables y variados  Los alimentos saludables incluyen frutas, verduras, pan integral, productos lácteos bajos en grasa, frijoles, lashay magras y pescado  Usted puede [employer] 500 a 700 calorías adicionales al día si usted está dando de lactar a elizabeth bebé  Puede que también necesite añadir proteína  · Limite los alimentos con azúcar añadida y grandes cantidades de [place]  [place] alimentos son altos en calorías y bajos en nutrientes saludables  Erin las etiquetas de los alimentos para que sepa cuál es la cantidad de azúcar y grasas que contiene los alimentos que quiere comer  · [employer] 8 a 10 vasos de agua al día  El agua le ayudará a producir suficiente leche para elizabeth bebé  También le ayudará a prevenir el estreñimiento  [place] un vaso de agua cada vez que amamanta a elizabeth bebé  · [address]  Pregunte a elizabeth médico cuáles vitaminas necesita  · Limite la cafeína y el alcohol si usted está alimentando a elizabeth bebé con [employer]  Dulcy Demetrio y el alcohol pueden pasar a la [employer]  Dulcy Demetrio y el alcohol pueden hacer que elizabeth bebé esté molesto  [place] también interfiere con el sueño de elizabeth bebé  Pregunte a elizabeth médico si usted puede davis alcohol o cafeína    Descanse y duerma:  Usted se puede sentir muy cansada en el periodo posparto  Dormir lo suficiente le ayudará a recuperarse y tener la energía para cuidar de elizabeth bebé  Los siguientes pueden ayudarle a dormir y descansar:  · [place] cuando elizabeth bebé esté tomando la siesta  Elizabeth bebé puede davis varias siestas guilherme el día  Descanse guilherme [employer]  · [address]  Muchas personas quieren venir a visitarla a usted y conocer al bebé  [place] a yoon amigos y familiares que la visiten en diferentes días  [place] le dará tiempo para descansar  · No planee demasiadas cosas en un día  Deje los quehaceres de la casa para que tenga tiempo para descansar  Poco a poca alvin más cada día  · Solicite ayuda de familiares, amigos y vecinos  Pida que le ayuden a ritesh la ropa, a limpiar o hacer mandados  También pregunte que alguien le cuide elizabeth bebé mientras irish fuentes siesta o se relaja  [place] a elizabeth kristal que la ayude con el cuidado del bebé  Sáquese leche para que elizabeth kristal pueda alimentar a elizabeth bebé por la noche  Ejercicios después del parto:  Espere hasta que elizabeth médico la autorice a hacer ejercicio  El ejercicio puede ayudarla adelgazar, aumentar elizabeth niveles de energía y controlar elizabeth estado de ánimo  También puede prevenir el estreñimiento y los coágulos  Empiece con un ejercicio leve vesna caminar  [employer] a medida que tenga Naveen  Es posible que necesite evitar hacer ejercicios para el abdomen por 1 a 2 semana después del parto  Hable con elizabeth médico sobre un plan de ejercicios que sea adecuado para usted  Actividad sexual después del parto:   · No tenga relaciones sexuales hasta que elizabeth médico lo autorice  Es posible que necesite esperar de 4 a 6 semanas antes de [employer] relación sexual  [place] puede evitar que contraiga fuentes infección y darle tiempo para recuperarse  · Elizabeth ciclo menstrual puede comenzar tan pronto vesna en 3 semanas después de misty a brandi  Elizabeth período puede demorarse si usted está dando de lactar a elizabeth bebé   Usted puede quedar embarazada antes de que le venga garrett primer período posparto  Consulte con garrett médico sobre los anticonceptivos que son los adecuados para usted  Algunos tipos de anticonceptivos no son Lyndee Mustache  Para [de-identified] y más información:  Participe en un santos de apoyo para madres primerizas  [place] ayuda a familiares y [facility] con los [address] de la casa, [place] y el cuidado de garrett bebé  · Office of Women's Health,  Department of Health and Human Services  [email or web address], [address] [facility] , [facility] [address]  [address], [address] [facility] , [facility] [address]  Phone: [phone]  Web Address: www womenshealth gov  · [facility] Postpartum [address] [place] , [address] [place] [address]  [address] [facility] , [address] [place] [address]  Web Address: [employer] be  org/pregnancy/postpartum-care  aspx  Programe fuentes judson de control con garrett obstetra o partera según las indicaciones:  Usted tendrá que acudir Tully 2 a 6 semanas a fuentes judson de control con garrett médico después del parto  Escriba las preguntas que tenga para que recuerde hacerlas guilherme yoon citas  © 2017 [facility] [facility] Information is for End User's use only and may not be sold, redistributed or otherwise used for commercial purposes  All illustrations and images included in CareNotes® are the copyrighted property of A D A M , Inc  or Charles Chavira  Esta información es sólo para uso en educación  Garrett intención no es darle un consejo médico sobre enfermedades o tratamientos  Colsulte con garrett Melven Rimes farmacéutico antes de seguir cualquier régimen médico para saber si es seguro y efectivo para usted

## 2018-04-26 NOTE — LACTATION NOTE
This note was copied from a baby's chart  Atif Even has no complaints and says every thing is going well  Discussed 2nd night syndrome and ways to calm infant  Hand out given  Information on hand expression given  Discussed benefits of knowing how to manually express breast including stimulating milk supply, softening nipple for latch and evacuating breast in the event of engorgement  Met with mother  Provided mother with Ready, Set, Baby booklet  Discussed Skin to Skin contact an benefits to mom and baby  Talked about the delay of the first bath until baby has adjusted  Spoke about the benefits of rooming in  Feeding on cue and what that means for recognizing infant's hunger  Avoidance of pacifiers for the first month discussed  Talked about exclusive breastfeeding for the first 6 months  Positioning and latch reviewed as well as showing images of other feeding positions  Discussed the properties of a good latch in any position  Reviewed hand/manual expression  Discussed s/s that baby is getting enough milk and some s/s that breastfeeding dyad may need further help  Gave information on common concerns, what to expect the first few weeks after delivery, preparing for other caregivers, and how partners can help  Resources for support also provided  Met with mother to go over feeding log since birth for the first week  Emphasized 8 or more (12) feedings in a 24 hour period, what to expect for the number of diapers per day of life and the progression of properties of the  stooling pattern  Discussed s/s that breastfeeding is going well after day 4 and when to get help from a pediatrician or lactation support person after day 4  Booklet included Breast Pumping Instructions, When You Go Back to Work or School, and Breastfeeding Resources for after discharge including access to the number for the [employer]      Discussed s/s engorgement and how to manage with medications and cool compresses as well as s/s mastitis and when to contact physician  Encoraged MOB and FOB to call for assistance, questions and concerns  Extension number for inpatient lactation support provided

## 2018-04-26 NOTE — PROGRESS NOTES
Pt desires permanent sterilization  Reviewed the risks, including infection, blood loss, blood clots, damage to surrounding organs such as bowel, bladder, major blood vessels and nerves, and potential adverse reactions to anesthesia  Explained this is an irreversible procedure and reviewed risk of regret  Pt still desires permanent sterilization  Reviewed alternatives such as other forms of contraception, including LARCs  Pt declined  Wanted Depo shot as bridge to surgery  All questions answered  Informed consent obtained       Diamond Rodriguez MD  OBGYN, PGY-1  4/26/2018 9:17 AM

## 2018-04-26 NOTE — PLAN OF CARE
DISCHARGE PLANNING     Discharge to home or other facility with appropriate resources Progressing        INFECTION - ADULT     Absence or prevention of progression during hospitalization Progressing     Absence of fever/infection during neutropenic period Progressing        Knowledge Deficit     Patient/family/caregiver demonstrates understanding of disease process, treatment plan, medications, and discharge instructions Progressing     Verbalizes understanding of labor plan Progressing        Labor & Delivery     Manages discomfort Progressing        PAIN - ADULT     Verbalizes/displays adequate comfort level or baseline comfort level Progressing        POSTPARTUM     Experiences normal postpartum course Progressing     Appropriate maternal -  bonding Progressing     Establishment of infant feeding pattern Progressing     Incision(s), wounds(s) or drain site(s) healing without S/S of infection Progressing        SAFETY ADULT     Patient will remain free of falls Progressing     Maintain or return to baseline ADL function Progressing     Maintain or return mobility status to optimal level Progressing

## 2018-04-26 NOTE — PROGRESS NOTES
Progress Note - OB/GYN   Jean Decker 35 y o  female MRN: [id number]  Unit/Bed#: -01 Encounter: [id number]    Assessment:  Post partum Day #1 s/p , stable  Rh negative    Plan:  Continue routine post partum care  Encourage ambulation  Encourage breastfeeding  Pain control PRN  Contraception: desires tubal  Will do depo bridge  Rh negative status: RHIG/Rhogam ordered    Subjective/Objective   Chief Complaint:     Post delivery    Subjective:   No complaints this morning  Open to early discharge as long as baby is cleared  Pain: yes, cramping, improved with meds  Tolerating PO: yes  Voiding: yes  Flatus: yes  BM: no  Ambulating: yes  Breastfeeding:  yes  Chest pain: no  Shortness of breath: no  Leg pain: no  Lochia: minimal    Objective:     Vitals: /71 (BP Location: Right arm)   Pulse 100   Temp 98 9 °F (37 2 °C) (Oral)   Resp 18   Ht 5' 4" (1 626 m)   Wt 78 9 kg (174 lb)   LMP 2017   SpO2 98%   Breastfeeding?  Yes   BMI 29 87 kg/m²       Intake/Output Summary (Last 24 hours) at 18 0651  Last data filed at 18 2200   Gross per 24 hour   Intake          1883 33 ml   Output             2050 ml   Net          -166 67 ml       Lab Results   Component Value Date    WBC 15 63 (H) 2018    HGB 12 9 2018    HCT 37 6 2018    MCV 85 2018     2018       Meds/Allergies   Current Facility-Administered Medications   Medication Dose Route Frequency    acetaminophen (TYLENOL) tablet 650 mg  650 mg Oral Q6H PRN    benzocaine-menthol-lanolin-aloe (DERMOPLAST) 20-0 5 % topical spray   Topical 4x Daily PRN    calcium carbonate (TUMS) chewable tablet 1,000 mg  1,000 mg Oral Daily PRN    diphenhydrAMINE (BENADRYL) tablet 25 mg  25 mg Oral Q6H PRN    docusate sodium (COLACE) capsule 100 mg  100 mg Oral BID    ferrous sulfate tablet 325 mg  325 mg Oral BID    hydrocortisone 1 % cream 1 application  1 application Topical PRN    ibuprofen (MOTRIN) tablet 600 mg  600 mg Oral Q6H PRN    ondansetron (ZOFRAN) injection 4 mg  4 mg Intravenous Q8H PRN    oxyCODONE-acetaminophen (PERCOCET) 5-325 mg per tablet 1 tablet  1 tablet Oral Q4H PRN    oxyCODONE-acetaminophen (PERCOCET) 5-325 mg per tablet 2 tablet  2 tablet Oral Q4H PRN    oxytocin (PITOCIN) 30 Units in lactated ringers 500 mL infusion  250 jose de jesus-units/min Intravenous Continuous    prenatal multivitamin tablet 1 tablet  1 tablet Oral Daily    Rho(D) immune globulin (RHOGAM ULTRA-FILTERED PLUS) IM injection 300 mcg  300 mcg Intramuscular Once    witch hazel-glycerin (TUCKS) topical pad 1 pad  1 pad Topical PRN       Physical Exam:     Gen: AAOx3, NAD  CV: RRR  Lungs: CTA b/l  Abd: Soft, non-tender, non-distended, no rebound or guarding  Uterine fundus firm and non-tender,  at the umbilicus   Ext: Non tender    Marina Child MD  OBGYN, PGY-1  4/26/2018 6:54 AM

## 2018-04-26 NOTE — PLAN OF CARE
DISCHARGE PLANNING     Discharge to home or other facility with appropriate resources Adequate for Discharge        INFECTION - ADULT     Absence or prevention of progression during hospitalization Adequate for Discharge     Absence of fever/infection during neutropenic period Adequate for Discharge        Knowledge Deficit     Patient/family/caregiver demonstrates understanding of disease process, treatment plan, medications, and discharge instructions Adequate for Discharge     Verbalizes understanding of labor plan Adequate for Discharge        Labor & Delivery     Manages discomfort Adequate for Discharge        PAIN - ADULT     Verbalizes/displays adequate comfort level or baseline comfort level Adequate for Discharge        POSTPARTUM     Experiences normal postpartum course Adequate for Discharge     Appropriate maternal -  bonding Adequate for Discharge     Establishment of infant feeding pattern Adequate for Discharge     Incision(s), wounds(s) or drain site(s) healing without S/S of infection Adequate for Discharge        SAFETY ADULT     Patient will remain free of falls Adequate for Discharge     Maintain or return to baseline ADL function Adequate for Discharge     Maintain or return mobility status to optimal level Adequate for Discharge

## 2018-04-26 NOTE — SOCIAL WORK
Breast pump consult  Per pt request, Ameda pump ordered from [facility] via [facility] for d/c today  Pt requests pump only if covered by insurance and CM notified [employer] of same  No other needs noted

## 2018-05-07 DIAGNOSIS — Z02.6 ENCOUNTER FOR EXAMINATION FOR INSURANCE PURPOSES: Primary | ICD-10-CM

## 2018-05-07 PROBLEM — Z64.1 MULTIPARITY: Status: ACTIVE | Noted: 2018-05-07

## 2018-05-22 ENCOUNTER — OFFICE VISIT (OUTPATIENT)
Dept: FAMILY MEDICINE CLINIC | Facility: CLINIC | Age: 33
End: 2018-05-22
Payer: COMMERCIAL

## 2018-05-22 VITALS
RESPIRATION RATE: 18 BRPM | SYSTOLIC BLOOD PRESSURE: 110 MMHG | BODY MASS INDEX: 27.31 KG/M2 | HEIGHT: 64 IN | HEART RATE: 92 BPM | WEIGHT: 160 LBS | DIASTOLIC BLOOD PRESSURE: 70 MMHG | TEMPERATURE: 98.5 F

## 2018-05-22 DIAGNOSIS — Z00.00 WELL ADULT EXAM: Primary | ICD-10-CM

## 2018-05-22 PROBLEM — J30.1 ALLERGIC RHINITIS DUE TO POLLEN: Status: ACTIVE | Noted: 2018-05-22

## 2018-05-22 PROBLEM — Z3A.40 40 WEEKS GESTATION OF PREGNANCY: Status: RESOLVED | Noted: 2018-02-19 | Resolved: 2018-05-22

## 2018-05-22 PROBLEM — O34.219 VBAC, DELIVERED: Status: RESOLVED | Noted: 2018-04-25 | Resolved: 2018-05-22

## 2018-05-22 PROCEDURE — 99385 PREV VISIT NEW AGE 18-39: CPT | Performed by: PHYSICIAN ASSISTANT

## 2018-05-22 NOTE — PATIENT INSTRUCTIONS
Follow-up with gynecology as scheduled to discuss tubal ligation  Routine follow-up physical in 1 year

## 2018-05-22 NOTE — PROGRESS NOTES
Assessment/Plan:    Patient Instructions   Follow-up with gynecology as scheduled to discuss tubal ligation  Routine follow-up physical in 1 year  M*Modal software was used to dictate this note  It may contain errors with dictating incorrect words/spelling  Please contact provider directly for any questions  Diagnoses and all orders for this visit:    Well adult exam          Subjective:      Patient ID: Yary Daigle is a 35 y o  female  Patient presents today for routine physical   She has no concerns at this time  She states that her gynecologist recommended that she establish care with a primary care provider  She states that she does have an upcoming appointment with the specialist for a tubal ligation  She states that she is not here for clearance for surgery  She has no past medical problems or taking any medications other than prenatal since she is breastfeeding at this time  The following portions of the patient's history were reviewed and updated as appropriate:   She  has a past medical history of Varicella  She   Patient Active Problem List    Diagnosis Date Noted    Well adult exam 2018    Allergic rhinitis due to pollen 2018    Multiparity 2018    History of  2018    Type O blood, Rh negative 2018    History of low transverse  section 2018    Varicose veins of legs 2017     She  has a past surgical history that includes  section (2012)  Her family history includes Arthritis in her paternal aunt and paternal grandmother; Depression in her sister; No Known Problems in her mother; Stroke in her paternal grandmother  She  reports that she has never smoked  She has never used smokeless tobacco  She reports that she does not drink alcohol or use drugs    Current Outpatient Prescriptions   Medication Sig Dispense Refill    Prenatal Vit-Fe Fumarate-FA (PRENATAL COMPLETE PO) Take 1 tablet by mouth daily No current facility-administered medications for this visit  Current Outpatient Prescriptions on File Prior to Visit   Medication Sig    Prenatal Vit-Fe Fumarate-FA (PRENATAL COMPLETE PO) Take 1 tablet by mouth daily    [DISCONTINUED] acetaminophen (TYLENOL) 325 mg tablet Take 2 tablets (650 mg total) by mouth every 6 (six) hours as needed for mild pain or headaches    [DISCONTINUED] benzocaine-menthol-lanolin-aloe (DERMOPLAST) 20-0 5 % topical spray Apply 1 application topically 4 (four) times a day as needed for mild pain    [DISCONTINUED] docusate sodium (COLACE) 100 mg capsule Take 1 capsule (100 mg total) by mouth 2 (two) times a day as needed for constipation    [DISCONTINUED] ibuprofen (MOTRIN) 600 mg tablet Take 1 tablet (600 mg total) by mouth every 6 (six) hours as needed for moderate pain     No current facility-administered medications on file prior to visit  She is allergic to pollen extract       Review of Systems      Objective:      /70   Pulse 92   Temp 98 5 °F (36 9 °C)   Resp 18   Ht 5' 3 5" (1 613 m)   Wt 72 6 kg (160 lb)   LMP 07/29/2017   BMI 27 90 kg/m²          Physical Exam   Constitutional: She appears well-developed and well-nourished  HENT:   Head: Normocephalic and atraumatic  Right Ear: External ear normal    Left Ear: External ear normal    Nose: Nose normal    Mouth/Throat: Oropharynx is clear and moist    Neck: Neck supple  No thyromegaly present  Cardiovascular: Normal rate, regular rhythm and normal heart sounds  Exam reveals no gallop and no friction rub  No murmur heard  Pulmonary/Chest: Effort normal and breath sounds normal    Abdominal: Soft  Bowel sounds are normal  She exhibits no mass  There is no tenderness  Lymphadenopathy:     She has no cervical adenopathy  Neurological: She is alert  Skin: Skin is warm  Psychiatric: She has a normal mood and affect   Her behavior is normal  Judgment and thought content normal

## 2018-05-31 ENCOUNTER — TRANSCRIBE ORDERS (OUTPATIENT)
Dept: LAB | Facility: HOSPITAL | Age: 33
End: 2018-05-31

## 2018-06-15 ENCOUNTER — OFFICE VISIT (OUTPATIENT)
Dept: OBGYN CLINIC | Facility: HOSPITAL | Age: 33
End: 2018-06-15
Payer: COMMERCIAL

## 2018-06-15 VITALS
WEIGHT: 159.8 LBS | SYSTOLIC BLOOD PRESSURE: 121 MMHG | HEIGHT: 64 IN | HEART RATE: 88 BPM | BODY MASS INDEX: 27.28 KG/M2 | DIASTOLIC BLOOD PRESSURE: 80 MMHG

## 2018-06-15 DIAGNOSIS — Z30.2 REQUEST FOR STERILIZATION: Primary | ICD-10-CM

## 2018-06-15 PROBLEM — Z00.00 WELL ADULT EXAM: Status: RESOLVED | Noted: 2018-05-22 | Resolved: 2018-06-15

## 2018-06-15 PROBLEM — Z67.41 TYPE O BLOOD, RH NEGATIVE: Status: RESOLVED | Noted: 2018-03-23 | Resolved: 2018-06-15

## 2018-06-15 PROBLEM — Z64.1 MULTIPARITY: Status: RESOLVED | Noted: 2018-05-07 | Resolved: 2018-06-15

## 2018-06-15 PROCEDURE — 99213 OFFICE O/P EST LOW 20 MIN: CPT | Performed by: OBSTETRICS & GYNECOLOGY

## 2018-06-15 RX ORDER — SODIUM CHLORIDE 9 MG/ML
125 INJECTION, SOLUTION INTRAVENOUS CONTINUOUS
Status: CANCELLED | OUTPATIENT
Start: 2018-06-15 | End: 2019-06-15

## 2018-06-15 NOTE — PROGRESS NOTES
History & Physical - OB/GYN   Candis Singh 35 y o  female MRN: [id number]  Unit/Bed#:  Encounter: [id number]    Chief complaint:  Discuss tubal ligation    HPI:  Ms Kait Bryant is a 35 y o  [id number] who presents for H&P for laparoscopic bilateral salpingectomy on 18  She is without complaints  She denies fever/chills, cp, sob, n/v/d, or abdominal pain  Patient reports that she has completed childbearing and is confident she wants no more children  She was counseled on her options for both LARC and permanent sterilization  She declines LARCs  Reviewed the differences between tubal ligation and salpingectomy, which are both completed laparoscopically and have similar operative times and recovery  Discussed theoretical reduction in ovarian cancer rates with salpingectomy  Also discussed that it is definitively irreversible, as no tube remains in her body to repair if she should opt for this later  Patient wishes to proceed with salpingectomy  Reviewed consent with patient and surgical risks, including but not limited to bleeding, infection, injury to surrounding structures (bowel, bladder, neurovascular structures), need for open or more extensive procedure, failure, and regret of procedure       Active Problems:  Patient Active Problem List   Diagnosis    Varicose veins of legs    History of low transverse  section    History of     Allergic rhinitis due to pollen    Request for sterilization         PMH:  Past Medical History:   Diagnosis Date    Varicella     childhood       PSH:  Past Surgical History:   Procedure Laterality Date     SECTION  2012    LTCS       Social Hx:  Denies tobacco, drugs, EtOH    OB Hx:  Obstetric History       T3      L3     SAB0   TAB0   Ectopic0   Multiple0   Live Births3       # Outcome Date GA Lbr Mook/2nd Weight Sex Delivery Anes PTL Lv   3 Term 18 40w5d 06:20 / 00:08 3195 g (7 lb 0 7 oz) M Vag-Spont EPI N LIA      Name: BRYANT,BABY BOY  (JUN)      Apgar1:  9                Apgar5: 9   2 Term 14 39w6d  3062 g (6 lb 12 oz) M    LIA   1 Term 12 40w0d  3118 g (6 lb 14 oz) F CS-LTranv   LIA      Complications: Fetal Intolerance      Obstetric Comments   Hx of         Meds:  Current Outpatient Prescriptions on File Prior to Visit   Medication Sig Dispense Refill    Prenatal Vit-Fe Fumarate-FA (PRENATAL COMPLETE PO) Take 1 tablet by mouth daily       No current facility-administered medications on file prior to visit  Allergies: Allergies   Allergen Reactions    Pollen Extract        Physical Exam:  /80 (BP Location: Right arm, Patient Position: Sitting, Cuff Size: Standard)   Pulse 88   Ht 5' 4" (1 626 m)   Wt 72 5 kg (159 lb 12 8 oz)   Breastfeeding? Yes   BMI 27 43 kg/m²     Physical Exam   Constitutional: She is oriented to person, place, and time  She appears well-developed and well-nourished  No distress  HENT:   Head: Normocephalic and atraumatic  Eyes: No scleral icterus  Cardiovascular: Normal rate, regular rhythm and normal heart sounds  Exam reveals no gallop and no friction rub  No murmur heard  Pulmonary/Chest: Effort normal and breath sounds normal  No accessory muscle usage  No respiratory distress  She has no wheezes  She has no rales  Abdominal: Soft  She exhibits no distension  There is no tenderness  There is no rebound and no guarding  Musculoskeletal: She exhibits no edema or tenderness  Neurological: She is alert and oriented to person, place, and time  Skin: Skin is warm and dry  No rash noted  No erythema  Psychiatric: She has a normal mood and affect  Her behavior is normal        Assessment:   35 y o   for pre-op H&P for laparoscopic bilateral salpingectomy    Plan:   1  Proceed with surgery on 18 as scheduled  2  Slips given for CBC/T&S to be done 3d preop  3  Urine hCG in preop holding  Currently on depo-provera for contraception  4  NPO from [place] preop

## 2018-06-27 ENCOUNTER — APPOINTMENT (OUTPATIENT)
Dept: LAB | Facility: HOSPITAL | Age: 33
End: 2018-06-27
Payer: COMMERCIAL

## 2018-06-27 DIAGNOSIS — Z30.2 REQUEST FOR STERILIZATION: ICD-10-CM

## 2018-06-27 LAB
ABO GROUP BLD: NORMAL
BLD GP AB SCN SERPL QL: NEGATIVE
ERYTHROCYTE [DISTWIDTH] IN BLOOD BY AUTOMATED COUNT: 12.4 % (ref 11.6–15.1)
HCT VFR BLD AUTO: 37.1 % (ref 34.8–46.1)
HGB BLD-MCNC: 12.8 G/DL (ref 11.5–15.4)
MCH RBC QN AUTO: 29.4 PG (ref 26.8–34.3)
MCHC RBC AUTO-ENTMCNC: 34.5 G/DL (ref 31.4–37.4)
MCV RBC AUTO: 85 FL (ref 82–98)
PLATELET # BLD AUTO: 240 THOUSANDS/UL (ref 149–390)
PMV BLD AUTO: 10.6 FL (ref 8.9–12.7)
RBC # BLD AUTO: 4.35 MILLION/UL (ref 3.81–5.12)
RH BLD: NEGATIVE
SPECIMEN EXPIRATION DATE: NORMAL
WBC # BLD AUTO: 9.33 THOUSAND/UL (ref 4.31–10.16)

## 2018-06-27 PROCEDURE — 86850 RBC ANTIBODY SCREEN: CPT

## 2018-06-27 PROCEDURE — 36415 COLL VENOUS BLD VENIPUNCTURE: CPT

## 2018-06-27 PROCEDURE — 85027 COMPLETE CBC AUTOMATED: CPT

## 2018-06-27 PROCEDURE — 86901 BLOOD TYPING SEROLOGIC RH(D): CPT

## 2018-06-27 PROCEDURE — 86900 BLOOD TYPING SEROLOGIC ABO: CPT

## 2018-06-29 NOTE — PRE-PROCEDURE INSTRUCTIONS
Pre-Surgery Instructions:   Medication Instructions    Prenatal Vit-Fe Fumarate-FA (PRENATAL COMPLETE PO) Instructed patient per Anesthesia Guidelines  Spoke to pt  Medication list reviewed & instructed  As of 6 29 18 pt instructed on tylenol only  Am DOS no meds  Showering instructions given by office, reviewed at time of call  All instructions verbally understood by patient, no further questions

## 2018-07-06 ENCOUNTER — ANESTHESIA (OUTPATIENT)
Dept: PERIOP | Facility: HOSPITAL | Age: 33
End: 2018-07-06
Payer: COMMERCIAL

## 2018-07-06 ENCOUNTER — ANESTHESIA EVENT (OUTPATIENT)
Dept: PERIOP | Facility: HOSPITAL | Age: 33
End: 2018-07-06
Payer: COMMERCIAL

## 2018-07-06 ENCOUNTER — HOSPITAL ENCOUNTER (OUTPATIENT)
Facility: HOSPITAL | Age: 33
Setting detail: OUTPATIENT SURGERY
Discharge: HOME | End: 2018-07-06
Attending: OBSTETRICS & GYNECOLOGY | Admitting: OBSTETRICS & GYNECOLOGY
Payer: COMMERCIAL

## 2018-07-06 VITALS
TEMPERATURE: 99 F | BODY MASS INDEX: 27.49 KG/M2 | SYSTOLIC BLOOD PRESSURE: 143 MMHG | OXYGEN SATURATION: 99 % | DIASTOLIC BLOOD PRESSURE: 90 MMHG | HEIGHT: 64 IN | WEIGHT: 161 LBS | HEART RATE: 76 BPM | RESPIRATION RATE: 16 BRPM

## 2018-07-06 DIAGNOSIS — Z64.1 MULTIPARITY: ICD-10-CM

## 2018-07-06 DIAGNOSIS — Z30.2 ENCOUNTER FOR FEMALE STERILIZATION PROCEDURE: Primary | ICD-10-CM

## 2018-07-06 PROBLEM — Z90.79 STATUS POST BILATERAL SALPINGECTOMY: Status: ACTIVE | Noted: 2018-07-06

## 2018-07-06 LAB — EXT PREGNANCY TEST URINE: NEGATIVE

## 2018-07-06 PROCEDURE — 88302 TISSUE EXAM BY PATHOLOGIST: CPT | Performed by: PATHOLOGY

## 2018-07-06 PROCEDURE — 58661 LAPAROSCOPY REMOVE ADNEXA: CPT | Performed by: OBSTETRICS & GYNECOLOGY

## 2018-07-06 PROCEDURE — 81025 URINE PREGNANCY TEST: CPT | Performed by: OBSTETRICS & GYNECOLOGY

## 2018-07-06 RX ORDER — OXYCODONE HYDROCHLORIDE AND ACETAMINOPHEN 5; 325 MG/1; MG/1
1 TABLET ORAL EVERY 4 HOURS PRN
Qty: 15 TABLET | Refills: 0 | Status: SHIPPED | OUTPATIENT
Start: 2018-07-06 | End: 2018-07-16

## 2018-07-06 RX ORDER — ONDANSETRON 2 MG/ML
4 INJECTION INTRAMUSCULAR; INTRAVENOUS ONCE AS NEEDED
Status: DISCONTINUED | OUTPATIENT
Start: 2018-07-06 | End: 2018-07-06 | Stop reason: HOSPADM

## 2018-07-06 RX ORDER — KETOROLAC TROMETHAMINE 30 MG/ML
INJECTION, SOLUTION INTRAMUSCULAR; INTRAVENOUS AS NEEDED
Status: DISCONTINUED | OUTPATIENT
Start: 2018-07-06 | End: 2018-07-06 | Stop reason: SURG

## 2018-07-06 RX ORDER — ACETAMINOPHEN 325 MG/1
650 TABLET ORAL EVERY 4 HOURS PRN
Status: DISCONTINUED | OUTPATIENT
Start: 2018-07-06 | End: 2018-07-06 | Stop reason: HOSPADM

## 2018-07-06 RX ORDER — MIDAZOLAM HYDROCHLORIDE 1 MG/ML
INJECTION INTRAMUSCULAR; INTRAVENOUS AS NEEDED
Status: DISCONTINUED | OUTPATIENT
Start: 2018-07-06 | End: 2018-07-06 | Stop reason: SURG

## 2018-07-06 RX ORDER — ONDANSETRON 2 MG/ML
INJECTION INTRAMUSCULAR; INTRAVENOUS AS NEEDED
Status: DISCONTINUED | OUTPATIENT
Start: 2018-07-06 | End: 2018-07-06 | Stop reason: SURG

## 2018-07-06 RX ORDER — PROPOFOL 10 MG/ML
INJECTION, EMULSION INTRAVENOUS AS NEEDED
Status: DISCONTINUED | OUTPATIENT
Start: 2018-07-06 | End: 2018-07-06 | Stop reason: SURG

## 2018-07-06 RX ORDER — FENTANYL CITRATE/PF 50 MCG/ML
25 SYRINGE (ML) INJECTION
Status: DISCONTINUED | OUTPATIENT
Start: 2018-07-06 | End: 2018-07-06 | Stop reason: HOSPADM

## 2018-07-06 RX ORDER — SODIUM CHLORIDE, SODIUM LACTATE, POTASSIUM CHLORIDE, CALCIUM CHLORIDE 600; 310; 30; 20 MG/100ML; MG/100ML; MG/100ML; MG/100ML
75 INJECTION, SOLUTION INTRAVENOUS CONTINUOUS
Status: DISCONTINUED | OUTPATIENT
Start: 2018-07-06 | End: 2018-07-06 | Stop reason: HOSPADM

## 2018-07-06 RX ORDER — GLYCOPYRROLATE 0.2 MG/ML
INJECTION INTRAMUSCULAR; INTRAVENOUS AS NEEDED
Status: DISCONTINUED | OUTPATIENT
Start: 2018-07-06 | End: 2018-07-06 | Stop reason: SURG

## 2018-07-06 RX ORDER — LIDOCAINE HYDROCHLORIDE 10 MG/ML
INJECTION, SOLUTION INFILTRATION; PERINEURAL AS NEEDED
Status: DISCONTINUED | OUTPATIENT
Start: 2018-07-06 | End: 2018-07-06 | Stop reason: SURG

## 2018-07-06 RX ORDER — SODIUM CHLORIDE 9 MG/ML
125 INJECTION, SOLUTION INTRAVENOUS CONTINUOUS
Status: DISCONTINUED | OUTPATIENT
Start: 2018-07-06 | End: 2018-07-06 | Stop reason: HOSPADM

## 2018-07-06 RX ORDER — IBUPROFEN 600 MG/1
600 TABLET ORAL EVERY 6 HOURS PRN
Qty: 30 TABLET | Refills: 0
Start: 2018-07-06

## 2018-07-06 RX ORDER — OXYCODONE HYDROCHLORIDE AND ACETAMINOPHEN 5; 325 MG/1; MG/1
1 TABLET ORAL EVERY 4 HOURS PRN
Status: DISCONTINUED | OUTPATIENT
Start: 2018-07-06 | End: 2018-07-06 | Stop reason: HOSPADM

## 2018-07-06 RX ORDER — FENTANYL CITRATE 50 UG/ML
INJECTION, SOLUTION INTRAMUSCULAR; INTRAVENOUS AS NEEDED
Status: DISCONTINUED | OUTPATIENT
Start: 2018-07-06 | End: 2018-07-06 | Stop reason: SURG

## 2018-07-06 RX ORDER — SUCCINYLCHOLINE CHLORIDE 20 MG/ML
INJECTION INTRAMUSCULAR; INTRAVENOUS AS NEEDED
Status: DISCONTINUED | OUTPATIENT
Start: 2018-07-06 | End: 2018-07-06 | Stop reason: SURG

## 2018-07-06 RX ORDER — LIDOCAINE HYDROCHLORIDE AND EPINEPHRINE 10; 10 MG/ML; UG/ML
INJECTION, SOLUTION INFILTRATION; PERINEURAL AS NEEDED
Status: DISCONTINUED | OUTPATIENT
Start: 2018-07-06 | End: 2018-07-06 | Stop reason: HOSPADM

## 2018-07-06 RX ORDER — OXYCODONE HYDROCHLORIDE AND ACETAMINOPHEN 5; 325 MG/1; MG/1
2 TABLET ORAL EVERY 4 HOURS PRN
Status: DISCONTINUED | OUTPATIENT
Start: 2018-07-06 | End: 2018-07-06 | Stop reason: HOSPADM

## 2018-07-06 RX ORDER — SODIUM CHLORIDE, SODIUM LACTATE, POTASSIUM CHLORIDE, CALCIUM CHLORIDE 600; 310; 30; 20 MG/100ML; MG/100ML; MG/100ML; MG/100ML
INJECTION, SOLUTION INTRAVENOUS CONTINUOUS PRN
Status: DISCONTINUED | OUTPATIENT
Start: 2018-07-06 | End: 2018-07-06

## 2018-07-06 RX ORDER — IBUPROFEN 600 MG/1
600 TABLET ORAL EVERY 6 HOURS PRN
Status: DISCONTINUED | OUTPATIENT
Start: 2018-07-06 | End: 2018-07-06 | Stop reason: HOSPADM

## 2018-07-06 RX ADMIN — SUCCINYLCHOLINE CHLORIDE 100 MG: 20 INJECTION, SOLUTION INTRAMUSCULAR; INTRAVENOUS at 17:18

## 2018-07-06 RX ADMIN — GLYCOPYRROLATE 0.1 MG: 0.2 INJECTION, SOLUTION INTRAMUSCULAR; INTRAVENOUS at 17:07

## 2018-07-06 RX ADMIN — LIDOCAINE HYDROCHLORIDE 100 MG: 10 INJECTION, SOLUTION INFILTRATION; PERINEURAL at 17:18

## 2018-07-06 RX ADMIN — FENTANYL CITRATE 100 MCG: 50 INJECTION, SOLUTION INTRAMUSCULAR; INTRAVENOUS at 17:40

## 2018-07-06 RX ADMIN — PROPOFOL 200 MG: 10 INJECTION, EMULSION INTRAVENOUS at 17:40

## 2018-07-06 RX ADMIN — SODIUM CHLORIDE, SODIUM LACTATE, POTASSIUM CHLORIDE, AND CALCIUM CHLORIDE: .6; .31; .03; .02 INJECTION, SOLUTION INTRAVENOUS at 16:30

## 2018-07-06 RX ADMIN — MIDAZOLAM 2 MG: 1 INJECTION INTRAMUSCULAR; INTRAVENOUS at 17:07

## 2018-07-06 RX ADMIN — FENTANYL CITRATE 50 MCG: 50 INJECTION, SOLUTION INTRAMUSCULAR; INTRAVENOUS at 17:14

## 2018-07-06 RX ADMIN — DEXAMETHASONE SODIUM PHOSPHATE 10 MG: 10 INJECTION INTRAMUSCULAR; INTRAVENOUS at 17:07

## 2018-07-06 RX ADMIN — PROPOFOL 200 MG: 10 INJECTION, EMULSION INTRAVENOUS at 17:18

## 2018-07-06 RX ADMIN — ONDANSETRON 4 MG: 2 INJECTION INTRAMUSCULAR; INTRAVENOUS at 17:07

## 2018-07-06 RX ADMIN — FENTANYL CITRATE 50 MCG: 50 INJECTION, SOLUTION INTRAMUSCULAR; INTRAVENOUS at 17:18

## 2018-07-06 RX ADMIN — KETOROLAC TROMETHAMINE 30 MG: 30 INJECTION, SOLUTION INTRAMUSCULAR at 17:53

## 2018-07-06 NOTE — DISCHARGE INSTRUCTIONS
Salpingectomía   CUIDADO Jacqueline BailLowery Screws salpingectomía  es fuentes cirugía para remover fuentes o ambas trompas de falopio  Las trompas de falopio transportan óvulos de los ovarios al [place]  Estas son parte del sistema reproductivo de la elif  Fuentes salpingectomía podría realizarse para tratar un embarazo ectópico, cáncer, endometriosis o fuentes infección  También podría realizarse para evitar un embarazo o algunos tipos de cáncer  Cómo prepararse para fuentes salpingectomía:   · Elizabeth médico le explicará cómo debe prepararse para la [facility]  Le puede indicar que no consuma ningún alimento ni bebida después de la medianoche del día de la [facility]  Toni Erichsen qué medicamentos puede davis el día de la [facility]  Es posible que deba suspender el uso de anticoagulantes, aspirina o medicamentos antiinflamatorios no esteroideos (ISAIAH) varios días antes de la cirugía  [place] podría evitar que tenga fuentes hemorragia antes y depsués de la [place]  · Es posible que deba hacerse análisis de [facility] u [facility] antes de la [facility]  Es posible que usted necesite exámenes de [place] o de [facility] antes de la [facility]  [place] ayudará a elizabeth médico a planear elizabeth cirugía  Podrían administrarle un antibiótico por vía intravenosa para evitar que contraiga fuentes infección bacteriana  Es posible que necesite vaciar eilzabeth vejiga antes de la [facility]  [place] podría ayudar a elizabeth médico a kaila yoon órganos reproductivos más claramente y evitar que elizabeth vejiga sufra daño  Qué sucederá Keesha Otoole salpingectomía:   · A usted le administrarán anestesia general para mantenerlo dormido y [place] de dolor guilherme la [facility]  Elizabeth médico podría extirpar yoon trompas de falopio laparoscópicamente o a través de fuentes apertura con fuentes incisión más jonathan  Si se realiza laparoscópicamente, elizabeth médico hará fuentes ó más incisiones pequeñas en elizabeth abdomen  Elizabeth médico colocará un laparoscopio y otros instrumentos en elizabeth abdomen a través de incisiones   Un laparoscopio es un tubo jacob de metal con Chayito November y Julio Art cámara en el extremo  Después el abdomen será inflado con gas (dióxido de carbono)  Kanorado separa elizabeth abdomen de miri órganos y [employer] espacio a elizabeth médico para trabajar  · En ambos tipos de procedimientos, se usarán instrumentos para cortar y extirpar [place] o ambas trompas de falopio  [address]  incisiones con pegamento médico, cinta o con puntos de sutura y los cubrirá con un vendaje  Puede usarse fuentes compresa vaginal o toalla sanitaria vaginal para absorber el sangrado  Fuentes compresa vaginal es fuentes gasa especial que se inserta en la vagina y se patricio antes de que a usted le den de ham o la pasen a fuentes habitación en el hospital   Qué sucederá después de [place] salpingectomía:  Los médicos lo mantendrán bajo observación hasta que se despierte  Es posible que usted tenga sangrado y secreción vaginal por varios días  Si elizabeth cirugía se realizó laparoscópicamente, es posible que usted sienta dolor en miri hombros o espalda  Kanorado se debe al aire que se colocó en elizabeth abdomen guilherme la cirugía laparoscópica  Es posible que usted pueda ir a elizabeth casa o que necesite pasar la noche en el [facility] kevin pronto pueda después de la cirugía para evitar la formación de coágulos de [place]  Riesgos de [place] salpingectomía:  Usted podría sangrar más de lo esperado o contraer fuentes infección  Miri ovarios, útero, vagina, intestinos o vejiga podrían sufrir daños guilherme la [place]  Se le podría formar un coágulo sanguíneo en el brazo o la pierna  Kanorado podría poner en peligro elizabeth sebastián  Usted podría tener dificultad para quedar embarazada si el mira de elizabeth trompa de falopio no funciona correctamente  Si se extirpan ambas trompas de falopio, usted todavía corre el riesgo de un embarazo ectópico   Llame al 911 en hugo de presentar lo siguiente:   · Usted se siente mareada, le hace falta el aire y tiene dolor de [place]  · Usted expectora tabby  · Usted tiene dificultad para respirar    Busque atención médica de inmediato si: · Garrett brazo o pierna se siente caliente, sensible y [place]  Se podría kaila inflamado y mckeon  · La tabby empapa el vendaje  · Se desprenden los puntos de sutura  · Usted empapa 1 toalla sanitaria en 1 hora  · Usted tiene dificultad para orinar o no puede orinar en lo absoluto  Pregúntele a garrett Erin Vance vitaminas y minerales son adecuados para usted  · Usted tiene fiebre o escalofríos  · Garrett herida está brendan, inflamada o drena pus  · Garrett vagina supura pus o huele mal      · Garrett dolor no desaparece después de que usted se irish el medicamento  · Usted tiene náuseas o está vomitando  · Usted tiene comezón en la piel, inflamación o un sarpullido  · Usted tiene preguntas o inquietudes acerca de garrett condición o cuidado  Medicamentos:  Es posible que usted necesite alguno de los siguientes:  · Un medicamento con receta para el dolor  podrían ser Rohan Connors  Pregunte al médico cómo debe davis elida medicamento de forma regalado  · AINEs (Analgésicos antiinflamatorios no esteroides) vesna el ibuprofeno, ayudan a disminuir la inflamación, el dolor y la [place]  Los AINEs pueden causar sangrado estomacal o problemas renales en ciertas personas  Si usted irish un medicamento anticoagulante, siempre pregúntele a garrett médico si los ISAIAH son seguros para usted  Siempre gregor la etiqueta de elida medicamento y [place] instrucciones  · [place] yoon medicamentos vesna se le haya indicado  Consulte con garrett médico si usted carmine que garrett medicamento no le está ayudando o si presenta efectos secundarios  Infórmele si es alérgico a cualquier medicamento  Mantenga fuentes lista actualizada de los [place], las vitaminas y los productos herbales que irish  Incluya los siguientes datos de los medicamentos: cantidad, frecuencia y motivo de administración  Traiga con usted la lista o los envases de la píldoras a yoon citas de seguimiento  Lleve la lista de los medicamentos con usted en hugo de fuentes emergencia    [address] instrucciones de garrett médico sobre el cuidado de yoon heridas:  Pregunte a agrrett médico cuándo se puede mojar la herida  No se bañe en la debbi hasta que garrett médico se lo autorice  Solo tome [employer]  Lave cuidadosamente el área alrededor de la herida con [place] y [place]  Deje que Guinevere Sandra y el agua corran suavemente sobre garrett incisión  No  frote la incisión  Seque el área y póngale vendajes nuevos y limpios vesna le indicaron  [place]ambie yoon vendajes cuando se mojen o ensucien  Si usted tiene tiras de [address], deje que se caigan solas  Actividad:  Pregunte a garrett médico cuándo puede retomar yoon actividades habituales  No tome duchas vaginales, ni use tampones o tenga relaciones sexuales hasta que garrett médico se lo autorice  Estas actividades podrían provocarle fuentes infección  No alvin ejercicio ni levante nada pesado hasta que garrett médico se lo autorice  [place] podría poner demasiada presión en garrett incisión  Acuda a yoon consultas de control con garrett médico según le indicaron  Anote yoon preguntas para que se acuerde de hacerlas guilherme yoon visitas  © 2017 [address] [employer] Information is for End User's use only and may not be sold, redistributed or otherwise used for commercial purposes  All illustrations and images included in CareNotes® are the copyrighted property of A D A M , Inc  or Charles Chavira  Esta información es sólo para uso en educación  Garrett intención no es darle un consejo médico sobre enfermedades o tratamientos  Colsulte con garrett Platte Guess farmacéutico antes de seguir cualquier régimen médico para saber si es seguro y efectivo para usted

## 2018-07-06 NOTE — INTERVAL H&P NOTE
H&P updated  The patient was examined and reviewed the consent, type of procedure, recovery with the patient using the language line

## 2018-07-06 NOTE — ANESTHESIA PREPROCEDURE EVALUATION
Review of Systems/Medical History    Chart reviewed  No history of anesthetic complications     Cardiovascular  Negative cardio ROS    Pulmonary  Negative pulmonary ROS        GI/Hepatic  Negative GI/hepatic ROS          Negative  ROS        Endo/Other  Negative endo/other ROS      GYN      Comment: (-) HCG DOS     Hematology   Musculoskeletal  Negative musculoskeletal ROS        Neurology  Negative neurology ROS      Psychology           Physical Exam    Airway    Mallampati score: II  TM Distance: >3 FB  Neck ROM: full     Dental   No notable dental hx     Cardiovascular  Comment: Negative ROS,     Pulmonary      Other Findings        Anesthesia Plan  ASA Score- 1     Anesthesia Type- general with ASA Monitors  Additional Monitors:   Airway Plan: ETT  Plan Factors-    Induction- intravenous  Postoperative Plan-     Informed Consent- Anesthetic plan and risks discussed with patient  I personally reviewed this patient with the CRNA  Discussed and agreed on the Anesthesia Plan with the CRNA  David Bah

## 2018-07-06 NOTE — OP NOTE
OPERATIVE REPORT  PATIENT NAME: Jovan Warner    :  1985  MRN: [id number]  Pt Location:  OR ROOM 06    SURGERY DATE: 2018    Surgeon(s) and Role:     * Harper Councilman, MD - Primary     * Jason Otero MD - Juventino Roblero MD - Tigre Yost MD - Assisting    Preop Diagnosis:  Multiparity [Z64 1]  Desire for sterilization    Post-Op Diagnosis Codes:     * Multiparity [Z64 1]  Desire for sterilization    Procedure(s) (LRB):  SALPINGECTOMY, LAPAROSCOPIC (Bilateral)    Specimen(s):  ID Type Source Tests Collected by Time Destination   1 :  Tissue Fallopian Tubes, Bilateral TISSUE EXAM Harper Councilman, MD 2018 3606        Estimated Blood Loss:   10 mL    Urine:  200 cc clear yellow urine by straight catheterization       Anesthesia Type:   General endotracheal  Lidocaine with 1% epinephrine    Operative Indications:  Multiparity [Z64 1]  Desire for sterilization    Operative Findings:  1  Small anteverted mobile uterus with no adnexal masses or fullness palpated on bimanual examination  2  Grossly normal uterus, fallopian tubes and ovaries    Complications:   None    Brief History  80-year-old  on Depo-Provera desiring permanent sterilization  All risks, benefits, and alternatives to the procedure were discussed with the patient and she had the opportunity to ask questions  The risk of regret of procedure was also addressed with the patient and options for LARC was discussed  Patient expressed desire to continue with tubal sterilization  Informed consent was obtained  Description of Procedure    Patient was taken to the operating room where correct patient and correct procedure were confirmed  General endotracheal anesthesia (GET) was administered and the patient was positioned on the OR table in the dorsal lithotomy position  All pressure points were padded and a jeremy hugger was placed to maintain control of core body temperature   A bimanual exam was performed and the uterus was noted to be anteverted, normal in size and consistency with no palpable adnexal masses or fullness  The patient was prepped and draped in the usual sterile fashion with chloroprep on the abdomen and betadine prep on the vagina and perineum  A time out was performed  Operative Technique    A straight catheter was introduced into the bladder, which was drained of 200cc of clear yellow urine  A weighted speculum and Grady retractor were inserted into the vagina and used to visualize the anterior lip of the cervix, which was then grasped with a single toothed tenaculum  The uterus was sounded to 8 cm  A cervical dilator was inserted into the cervix and secured to the tenaculum  The speculum was removed from the vagina  Sterile gloves were then exchanged and attention was turned to the abdomen  A total of 10 cc 1% lidocaine with epinephrine were infiltrated at trocar sites  A 5mm incision was made at the inferior edge of the umbilicus for introduction of a 5mm trocar  Trocar was introduced under direct visualization  Pneumoperitoneum was then established to a maximum of 15mmHg  The entire abdomen and pelvis was inspected and there was no evidence of injury to bowel, bladder, vasculature, or other structures  Attention was then turned to the pelvis  Patient was placed in Trendelenburg and the uterus was elevated to visualize the fallopian tubes  There was noted to be grossly normal tubes and ovaries bilaterally  Two additional port sites were selected in the left and right lower abdomen approximately 2cm superior and medial to the iliac crests  A 5mm incision was made for introduction of a 5mm trocar under direct visualization at each site  A [place] grasper probe was inserted through this port and used to visualize the fimbriated ends of the tubes      The left fallopian tube was grasped at its fimbriated end with a blunt grasper and elevated to visualize the mesosalpinx  The Harmonic device was used to ligate along the mesosalpinx, working proximally and taking care to avoid ovarian vasculature  Approximately 2cm from the cornua, the Harmonic device was used to amputate fallopian tube  This was then withdrawn from the abdominal cavity and sent for pathology  Attention was then turned to the contralateral tube, which was amputated in similar fashion  Good hemostasis was confirmed following salpingectomy  Following salpingectomy, pneumoperitoneum was allowed to escape  Adequate hemostasis was visualized  The inferior trocars were removed under direct visualization  The laparoscope was withdrawn from the abdomen, followed by its trocar sleeve at the umbilicus  Skin incisions were closed with running absorbable suture of 4-0 monocryl  Attention was turned to the vagina  Weighted speculum was reinserted into the vagina and the cervical dilator was withdrawn  Single toothed tenaculum was removed from the anterior lip of the cervix  Good hemostasis was confirmed at the tenaculum puncture sites  Speculum was then removed from the vagina  At the conclusion of the procedure, all needle, sponge, and instrument counts were noted to be correct x2  Patient tolerated the procedure well and was transferred to PACU in stable condition prior to discharge with follow up in 1-2 weeks  Dr Kathi Delgado was present and participated in all key portions of the case      Patient Disposition:   PACU to home    SIGNATURE: Erika Elena MD  DATE: July 6, 2018  TIME: 6:18 PM

## 2018-07-06 NOTE — ANESTHESIA POSTPROCEDURE EVALUATION
Post-Op Assessment Note      CV Status:  Stable    Post-procedure mental status: Pt  sleepy, yet easily arousable  Hydration Status:  Stable    PONV Controlled:  None    Airway Patency:  Patent    Post Op Vitals Reviewed: Yes          Staff: CRNA       Comments: Pt  to Pacu  Report given  Course uneventful  VSS  Airway patent            /66 (07/06/18 1816)    Temp (P) 97 7 °F (36 5 °C) (07/06/18 1814)    Pulse 99 (07/06/18 1816)   Resp 16 (07/06/18 1816)    SpO2 99 % (07/06/18 1816)

## 2018-07-06 NOTE — H&P (VIEW-ONLY)
History & Physical - OB/GYN   Jaclyn John 35 y o  female MRN: [id number]  Unit/Bed#:  Encounter: [id number]    Chief complaint:  Discuss tubal ligation    HPI:  Ms oKjo Valdivia is a 35 y o  [id number] who presents for H&P for laparoscopic bilateral salpingectomy on 18  She is without complaints  She denies fever/chills, cp, sob, n/v/d, or abdominal pain  Patient reports that she has completed childbearing and is confident she wants no more children  She was counseled on her options for both LARC and permanent sterilization  She declines LARCs  Reviewed the differences between tubal ligation and salpingectomy, which are both completed laparoscopically and have similar operative times and recovery  Discussed theoretical reduction in ovarian cancer rates with salpingectomy  Also discussed that it is definitively irreversible, as no tube remains in her body to repair if she should opt for this later  Patient wishes to proceed with salpingectomy  Reviewed consent with patient and surgical risks, including but not limited to bleeding, infection, injury to surrounding structures (bowel, bladder, neurovascular structures), need for open or more extensive procedure, failure, and regret of procedure       Active Problems:  Patient Active Problem List   Diagnosis    Varicose veins of legs    History of low transverse  section    History of     Allergic rhinitis due to pollen    Request for sterilization         PMH:  Past Medical History:   Diagnosis Date    Varicella     childhood       PSH:  Past Surgical History:   Procedure Laterality Date     SECTION  2012    LTCS       Social Hx:  Denies tobacco, drugs, EtOH    OB Hx:  Obstetric History       T3      L3     SAB0   TAB0   Ectopic0   Multiple0   Live Births3       # Outcome Date GA Lbr Mook/2nd Weight Sex Delivery Anes PTL Lv   3 Term 18 40w5d 06:20 / 00:08 3195 g (7 lb 0 7 oz) M Vag-Spont EPI N LIA      Name: BRYANT,BABY BOY  (JUN)      Apgar1:  9                Apgar5: 9   2 Term 14 39w6d  3062 g (6 lb 12 oz) M    LIA   1 Term 12 40w0d  3118 g (6 lb 14 oz) F CS-LTranv   LIA      Complications: Fetal Intolerance      Obstetric Comments   Hx of         Meds:  Current Outpatient Prescriptions on File Prior to Visit   Medication Sig Dispense Refill    Prenatal Vit-Fe Fumarate-FA (PRENATAL COMPLETE PO) Take 1 tablet by mouth daily       No current facility-administered medications on file prior to visit  Allergies: Allergies   Allergen Reactions    Pollen Extract        Physical Exam:  /80 (BP Location: Right arm, Patient Position: Sitting, Cuff Size: Standard)   Pulse 88   Ht 5' 4" (1 626 m)   Wt 72 5 kg (159 lb 12 8 oz)   Breastfeeding? Yes   BMI 27 43 kg/m²     Physical Exam   Constitutional: She is oriented to person, place, and time  She appears well-developed and well-nourished  No distress  HENT:   Head: Normocephalic and atraumatic  Eyes: No scleral icterus  Cardiovascular: Normal rate, regular rhythm and normal heart sounds  Exam reveals no gallop and no friction rub  No murmur heard  Pulmonary/Chest: Effort normal and breath sounds normal  No accessory muscle usage  No respiratory distress  She has no wheezes  She has no rales  Abdominal: Soft  She exhibits no distension  There is no tenderness  There is no rebound and no guarding  Musculoskeletal: She exhibits no edema or tenderness  Neurological: She is alert and oriented to person, place, and time  Skin: Skin is warm and dry  No rash noted  No erythema  Psychiatric: She has a normal mood and affect  Her behavior is normal        Assessment:   35 y o   for pre-op H&P for laparoscopic bilateral salpingectomy    Plan:   1  Proceed with surgery on 18 as scheduled  2  Slips given for CBC/T&S to be done 3d preop  3  Urine hCG in preop holding  Currently on depo-provera for contraception  4  NPO from [place] preop

## 2018-07-06 NOTE — PERIOPERATIVE NURSING NOTE
Patient requests  to discuss the surgery with Dr Amina Parisi phone used for   Pt does not require further interpretation and all her questions were answered

## 2018-07-24 ENCOUNTER — OFFICE VISIT (OUTPATIENT)
Dept: OBGYN CLINIC | Facility: HOSPITAL | Age: 33
End: 2018-07-24

## 2018-07-24 VITALS
WEIGHT: 167.2 LBS | SYSTOLIC BLOOD PRESSURE: 143 MMHG | HEIGHT: 64 IN | HEART RATE: 95 BPM | BODY MASS INDEX: 28.54 KG/M2 | DIASTOLIC BLOOD PRESSURE: 94 MMHG

## 2018-07-24 DIAGNOSIS — Z90.79 STATUS POST BILATERAL SALPINGECTOMY: Primary | ICD-10-CM

## 2018-07-24 PROBLEM — Z30.2 ENCOUNTER FOR FEMALE STERILIZATION PROCEDURE: Status: RESOLVED | Noted: 2018-07-06 | Resolved: 2018-07-24

## 2018-07-24 PROBLEM — Z30.2 REQUEST FOR STERILIZATION: Status: RESOLVED | Noted: 2018-06-15 | Resolved: 2018-07-24

## 2018-07-24 PROCEDURE — 99024 POSTOP FOLLOW-UP VISIT: CPT | Performed by: OBSTETRICS & GYNECOLOGY

## 2018-07-24 NOTE — PROGRESS NOTES
Post Operative Visit  Nehemias  2018      Bo Lundberg is a 34 yo  who is status post laparoscopic bilateral tubal ligation via salpingectomy on 2018  Procedure was uncomplicated and patient tolerated well  Patient reports she has been doing well since surgery  She does have some complaints of occasional cramping and reports she has had irregular vaginal spotting since the birth of her son 3 months ago  She has not had a normal menstrual period since the delivery of her son  Of note, she is currently breastfeeding and did receive Depo-Provera 2018 prior to her procedure  A urine pregnancy test prior to procedure was negative  She has been taking Tylenol for cramping which has been helping her  She has been eating and drinking well without nausea or vomiting  Urinating and having regular bowel movements without any issues  She denies any additional complaints such as fevers, chills, chest pain, shortness of breath, lightheadedness or dizziness  Patient has questions of receiving 2 weeks off of work for her surgery  I explained to the patient that she had a minimally invasive procedure and that this is typically given 1-2 days off at maximum and I cannot give her 2 weeks off for her surgery  Reports understanding  Objective:  /94 (BP Location: Right arm, Patient Position: Sitting, Cuff Size: Standard)   Pulse 95   Ht 5' 4" (1 626 m)   Wt 75 8 kg (167 lb 3 2 oz)   BMI 28 70 kg/m²   General: Appears calm and in no acute distress  Abdomen:  Soft, nontender, no distention, no rebound, no guarding, trocar sites appear to be healing well and are without erythema or discharge  SSE:  Labia, vagina and cervix appear normal without any lesions  No blood noted in vaginal vault  Minimal mucoid discharge noted  A/P:  70-year-old  who is 3 weeks status post a bilateral tubal ligation via salpingectomy doing well postoperatively    Amenorrhea and irregular vaginal spotting most likely secondary to her breast-feeding status and receiving Depo-Provera  Discussed this with patient and she is to monitor her vaginal bleeding and return in 3 months to follow-up      Jacquelyn Patterson MD  07/24/18

## 2022-04-26 ENCOUNTER — OFFICE VISIT (OUTPATIENT)
Dept: FAMILY MEDICINE CLINIC | Facility: CLINIC | Age: 37
End: 2022-04-26

## 2022-04-26 VITALS
HEART RATE: 88 BPM | OXYGEN SATURATION: 99 % | BODY MASS INDEX: 28.7 KG/M2 | WEIGHT: 162 LBS | SYSTOLIC BLOOD PRESSURE: 132 MMHG | HEIGHT: 63 IN | TEMPERATURE: 97.5 F | RESPIRATION RATE: 18 BRPM | DIASTOLIC BLOOD PRESSURE: 82 MMHG

## 2022-04-26 DIAGNOSIS — Z23 ENCOUNTER FOR IMMUNIZATION: ICD-10-CM

## 2022-04-26 DIAGNOSIS — L90.6 STRETCH MARKS: ICD-10-CM

## 2022-04-26 DIAGNOSIS — E66.3 OVERWEIGHT: ICD-10-CM

## 2022-04-26 DIAGNOSIS — J30.1 SEASONAL ALLERGIC RHINITIS DUE TO POLLEN: ICD-10-CM

## 2022-04-26 DIAGNOSIS — Z00.00 ENCOUNTER FOR MEDICAL EXAMINATION TO ESTABLISH CARE: Primary | ICD-10-CM

## 2022-04-26 PROBLEM — Z98.891 HISTORY OF VBAC: Status: RESOLVED | Noted: 2018-04-25 | Resolved: 2022-04-26

## 2022-04-26 PROBLEM — Z98.891 HISTORY OF LOW TRANSVERSE CESAREAN SECTION: Status: RESOLVED | Noted: 2018-02-19 | Resolved: 2022-04-26

## 2022-04-26 PROCEDURE — 99204 OFFICE O/P NEW MOD 45 MIN: CPT

## 2022-04-26 PROCEDURE — 90686 IIV4 VACC NO PRSV 0.5 ML IM: CPT

## 2022-04-26 PROCEDURE — 90471 IMMUNIZATION ADMIN: CPT

## 2022-04-26 RX ORDER — FLUTICASONE PROPIONATE 50 MCG
1 SPRAY, SUSPENSION (ML) NASAL DAILY
Qty: 16 G | Refills: 3 | Status: SHIPPED | OUTPATIENT
Start: 2022-04-26

## 2022-04-26 NOTE — PATIENT INSTRUCTIONS
Goteo retronasal   CUIDADO AMBULATORIO:   El goteo retronasal es fuentes condición que causa la acumulación de fuentes gran cantidad de moco en la garganta o la nariz  También se denomina síndrome de tos asociado a patología de la vía aérea superior debido a que la mucosidad provoca tos repetida  Usted puede tener dolor de garganta o hinchazón de los tejidos de la garganta  Podría sentir vesna que tiene fuentes protuberancia en la garganta  También puede sentir la necesidad de despejar la garganta a menudo  Comuníquese con elizabeth médico si:  · Usted tiene dificultad para tragar debido a la mucosidad  · Usted tiene síntomas nuevos o estos empeoran, incluso después del [place]  · Usted tiene signos de [address], vesna mucosidad FELY o [place], o [place]  · Usted tiene preguntas o inquietudes acerca de elizabeth condición o cuidado  El tratamiento podría incluir cualquiera de los siguientes:  · Los medicamentos pueden ser administrados para diluir la mucosidad  Puede que necesite tragar el medicamento o [employer] un dispositivo para limpiar los senos paranasales con un líquido que se arroja a chorros en la nariz  También podría necesitar aerosoles nasales para mantener la humedad de los tejidos en la nariz  Los medicamentos también pueden aliviar la congestión  Los medicamentos para la alergia pueden ayudar si los síntomas son provocados por alergias estacionales, vesna la fiebre del heno  Usted podría necesitar medicamentos para ayudar a controlar la enfermedad por reflujo gastroesofágico     · Los antibióticos podrían ser necesarios para tratar fuentes infección bacteriana  Controle el goteo retronasal:  · Use un humidificador o vaporizador  Use un humidificador de stefany frío o un vaporizador para elevar la humedad en elizabeth casa  [place] podría ayudarle a respirar con Verneice Hoist  · [employer] líquidos vesna se le indique   Los líquidos ayudan a mantener humectadas yoon vías respiratorias y ayudarle a [address] por medio de la tos  Pregunte cuánto líquido debe davis cada día y cuáles líquidos son los más adecuados para usted  · Evite el aire frío y el aire seco y caliente  El aire frío o el aire seco pueden provocar goteo retronasal  Trate de permanecer dentro en días fríos, o de [employer] boca [place]  No permanezca mucho tiempo en áreas con aire seco y caliente  · No fume y evite el humo de [place]  La nicotina y otros químicos en los cigarrillos y cigarros pueden irritar la garganta y empeorar la tos  Pida información a elizabeth médico si usted actualmente fuma y necesita ayuda para dejar de fumar  Los cigarrillos electrónicos o el tabaco sin humo igualmente contienen nicotina  Consulte con elizabeth médico antes de [employer]  Acuda a la consulta de control con elizabeth médico según las indicaciones: Anote yoon preguntas para que se acuerde de hacerlas guilherme yoon visitas  © Copyright [employer] 2022 Information is for End User's use only and may not be sold, redistributed or otherwise used for commercial purposes  All illustrations and images included in CareNotes® are the copyrighted property of A D A [employer]  or [address] [place] [address] Avenue es sólo para uso en educación  Elizabeth intención no es darle un consejo médico sobre enfermedades o tratamientos  Colsulte con elizabeth Nisha Angel farmacéutico antes de seguir cualquier régimen médico para saber si es seguro y efectivo para usted

## 2022-04-26 NOTE — PROGRESS NOTES
Assessment/Plan:    Overweight  BMI Counseling: Body mass index is 28 7 kg/m²  The BMI is above normal  Nutrition recommendations include reducing portion sizes, decreasing overall calorie intake, 3-5 servings of fruits/vegetables daily, reducing fast food intake, consuming healthier snacks, decreasing soda and/or juice intake, moderation in carbohydrate intake, increasing intake of lean protein, reducing intake of saturated fat and trans fat and reducing intake of cholesterol  Exercise recommendations include moderate aerobic physical activity for 150 minutes/week  Allergic rhinitis due to pollen  -avoid triggers  -flonase daily  -Antihistamines prn -continue with prn allegra as this is what she has been using at home      Encounter for medical examination to establish care  Reviewed care gaps with pt & importance of completing all tasks for complete healthcare maintenance  -Advised pt to schedule cervical CA screening appt  Stretch marks  Recommend she try otc cocoa butter for this       Diagnoses and all orders for this visit:    Encounter for medical examination to establish care    Overweight  -     Basic metabolic panel; Future  -     Lipid Panel with Direct LDL reflex; Future  -     CBC and differential; Future    Seasonal allergic rhinitis due to pollen  -     fluticasone (FLONASE) 50 mcg/act nasal spray; 1 spray into each nostril daily    Encounter for immunization  -     influenza vaccine, quadrivalent, 0 5 mL, preservative-free, for adult and pediatric patients 6 mos+ (AFLURIA, FLUARIX, FLULAVAL, FLUZONE)    Stretch marks  -     Ambulatory Referral to Dermatology; Future          Subjective:      Patient ID: Tashi Wood is a 40 y o  female  Tashi Wood is a 40 y o  female  has a past medical history of Varicella  has a past surgical history that includes  section (2012); pr lap,rmv  adnexal structure (Bilateral, 2018); and Tubal ligation      Pt presents today to establish care  She reports stretch marks in her bilateral lower extremities  She would like to see dermatology for this  The following portions of the patient's history were reviewed and updated as appropriate: allergies, current medications, past family history, past medical history, past social history, past surgical history and problem list     Review of Systems   Constitutional: Negative for chills and fever  HENT: Negative for ear pain and sore throat  Eyes: Negative for pain and visual disturbance  Respiratory: Negative for cough and shortness of breath  Cardiovascular: Negative for chest pain and palpitations  Gastrointestinal: Negative for abdominal pain and vomiting  Genitourinary: Negative for dysuria and hematuria  Musculoskeletal: Negative for arthralgias and back pain  Skin: Negative for color change and rash  Allergic/Immunologic: Positive for environmental allergies  Neurological: Negative for seizures and syncope  All other systems reviewed and are negative  Objective:      /82 (BP Location: Left arm, Patient Position: Sitting, Cuff Size: Adult)   Pulse 88   Temp 97 5 °F (36 4 °C) (Temporal)   Resp 18   Ht 5' 3" (1 6 m)   Wt 73 5 kg (162 lb)   LMP 04/08/2022   SpO2 99%   BMI 28 70 kg/m²          Physical Exam  Vitals and nursing note reviewed  Constitutional:       Appearance: She is overweight  HENT:      Head: Normocephalic and atraumatic  Right Ear: External ear normal       Left Ear: External ear normal       Nose: Nose normal       Right Turbinates: Pale  Left Turbinates: Pale  Comments: Nasal polyp in right nare  Eyes:      Extraocular Movements: Extraocular movements intact  Conjunctiva/sclera: Conjunctivae normal       Pupils: Pupils are equal, round, and reactive to light  Cardiovascular:      Rate and Rhythm: Normal rate and regular rhythm  Pulses: Normal pulses  Heart sounds: Normal heart sounds  Pulmonary:      Effort: Pulmonary effort is normal       Breath sounds: Normal breath sounds  Abdominal:      General: Bowel sounds are normal       Palpations: Abdomen is soft  Tenderness: There is no abdominal tenderness  Musculoskeletal:         General: Normal range of motion  Cervical back: Normal range of motion  Skin:     General: Skin is warm and dry  Neurological:      General: No focal deficit present  Mental Status: She is alert and oriented to person, place, and time  Mental status is at baseline  Psychiatric:         Mood and Affect: Mood normal          Behavior: Behavior normal          Thought Content:  Thought content normal

## 2022-04-26 NOTE — ASSESSMENT & PLAN NOTE
Reviewed care gaps with pt & importance of completing all tasks for complete healthcare maintenance  -Advised pt to schedule cervical CA screening appt

## 2022-04-26 NOTE — ASSESSMENT & PLAN NOTE
-avoid triggers  -flonase daily  -Antihistamines prn -continue with prn allegra as this is what she has been using at home

## 2022-04-26 NOTE — ASSESSMENT & PLAN NOTE
BMI Counseling: Body mass index is 28 7 kg/m²  The BMI is above normal  Nutrition recommendations include reducing portion sizes, decreasing overall calorie intake, 3-5 servings of fruits/vegetables daily, reducing fast food intake, consuming healthier snacks, decreasing soda and/or juice intake, moderation in carbohydrate intake, increasing intake of lean protein, reducing intake of saturated fat and trans fat and reducing intake of cholesterol  Exercise recommendations include moderate aerobic physical activity for 150 minutes/week

## 2022-04-28 ENCOUNTER — LAB (OUTPATIENT)
Dept: LAB | Facility: CLINIC | Age: 37
End: 2022-04-28
Payer: COMMERCIAL

## 2022-04-28 DIAGNOSIS — E66.3 OVERWEIGHT: ICD-10-CM

## 2022-04-28 LAB
ANION GAP SERPL CALCULATED.3IONS-SCNC: 5 MMOL/L (ref 4–13)
BASOPHILS # BLD AUTO: 0.04 THOUSANDS/ΜL (ref 0–0.1)
BASOPHILS NFR BLD AUTO: 1 % (ref 0–1)
BUN SERPL-MCNC: 11 MG/DL (ref 5–25)
CALCIUM SERPL-MCNC: 9.3 MG/DL (ref 8.3–10.1)
CHLORIDE SERPL-SCNC: 107 MMOL/L (ref 100–108)
CHOLEST SERPL-MCNC: 169 MG/DL
CO2 SERPL-SCNC: 24 MMOL/L (ref 21–32)
CREAT SERPL-MCNC: 0.56 MG/DL (ref 0.6–1.3)
EOSINOPHIL # BLD AUTO: 0.16 THOUSAND/ΜL (ref 0–0.61)
EOSINOPHIL NFR BLD AUTO: 2 % (ref 0–6)
ERYTHROCYTE [DISTWIDTH] IN BLOOD BY AUTOMATED COUNT: 12.8 % (ref 11.6–15.1)
GFR SERPL CREATININE-BSD FRML MDRD: 119 ML/MIN/1.73SQ M
GLUCOSE SERPL-MCNC: 103 MG/DL (ref 65–140)
HCT VFR BLD AUTO: 37.6 % (ref 34.8–46.1)
HDLC SERPL-MCNC: 43 MG/DL
HGB BLD-MCNC: 12.6 G/DL (ref 11.5–15.4)
IMM GRANULOCYTES # BLD AUTO: 0.02 THOUSAND/UL (ref 0–0.2)
IMM GRANULOCYTES NFR BLD AUTO: 0 % (ref 0–2)
LDLC SERPL CALC-MCNC: 84 MG/DL (ref 0–100)
LYMPHOCYTES # BLD AUTO: 2.59 THOUSANDS/ΜL (ref 0.6–4.47)
LYMPHOCYTES NFR BLD AUTO: 36 % (ref 14–44)
MCH RBC QN AUTO: 29 PG (ref 26.8–34.3)
MCHC RBC AUTO-ENTMCNC: 33.5 G/DL (ref 31.4–37.4)
MCV RBC AUTO: 86 FL (ref 82–98)
MONOCYTES # BLD AUTO: 0.45 THOUSAND/ΜL (ref 0.17–1.22)
MONOCYTES NFR BLD AUTO: 6 % (ref 4–12)
NEUTROPHILS # BLD AUTO: 4.03 THOUSANDS/ΜL (ref 1.85–7.62)
NEUTS SEG NFR BLD AUTO: 55 % (ref 43–75)
NRBC BLD AUTO-RTO: 0 /100 WBCS
PLATELET # BLD AUTO: 225 THOUSANDS/UL (ref 149–390)
PMV BLD AUTO: 11.9 FL (ref 8.9–12.7)
POTASSIUM SERPL-SCNC: 3.7 MMOL/L (ref 3.5–5.3)
RBC # BLD AUTO: 4.35 MILLION/UL (ref 3.81–5.12)
SODIUM SERPL-SCNC: 136 MMOL/L (ref 136–145)
TRIGL SERPL-MCNC: 210 MG/DL
WBC # BLD AUTO: 7.29 THOUSAND/UL (ref 4.31–10.16)

## 2022-04-28 PROCEDURE — 36415 COLL VENOUS BLD VENIPUNCTURE: CPT

## 2022-04-28 PROCEDURE — 80061 LIPID PANEL: CPT

## 2022-04-28 PROCEDURE — 85025 COMPLETE CBC W/AUTO DIFF WBC: CPT

## 2022-04-28 PROCEDURE — 80048 BASIC METABOLIC PNL TOTAL CA: CPT

## 2022-09-13 DIAGNOSIS — J30.1 SEASONAL ALLERGIC RHINITIS DUE TO POLLEN: ICD-10-CM

## 2022-09-13 RX ORDER — FLUTICASONE PROPIONATE 50 MCG
SPRAY, SUSPENSION (ML) NASAL
Qty: 48 ML | Refills: 2 | Status: SHIPPED | OUTPATIENT
Start: 2022-09-13

## (undated) DEVICE — ADHESIVE SKN CLSR HISTOACRYL FLEX 0.5ML LF

## (undated) DEVICE — IRRIG ENDO FLO TUBING

## (undated) DEVICE — INTENDED FOR TISSUE SEPARATION, AND OTHER PROCEDURES THAT REQUIRE A SHARP SURGICAL BLADE TO PUNCTURE OR CUT.: Brand: BARD-PARKER SAFETY BLADES SIZE 11, STERILE

## (undated) DEVICE — ENDOPATH XCEL BLADELESS TROCARS WITH STABILITY SLEEVES: Brand: ENDOPATH XCEL

## (undated) DEVICE — ENDOPATH XCEL UNIVERSAL TROCAR STABLILITY SLEEVES: Brand: ENDOPATH XCEL

## (undated) DEVICE — SUT MONOCRYL 4-0 PS-2 18 IN Y496G

## (undated) DEVICE — SCD SEQUENTIAL COMPRESSION COMFORT SLEEVE MEDIUM KNEE LENGTH: Brand: KENDALL SCD

## (undated) DEVICE — GLOVE SRG BIOGEL ECLIPSE 6.5

## (undated) DEVICE — 3000CC GUARDIAN II: Brand: GUARDIAN

## (undated) DEVICE — CHLORAPREP HI-LITE 26ML ORANGE

## (undated) DEVICE — INSUFLATION TUBING INSUFLOW (LEXION)

## (undated) DEVICE — GLOVE INDICATOR PI UNDERGLOVE SZ 6.5 BLUE

## (undated) DEVICE — BETHLEHEM UNIVERSAL GYN LAP PK: Brand: CARDINAL HEALTH

## (undated) DEVICE — HARMONIC 1100 SHEARS, 36CM SHAFT LENGTH: Brand: HARMONIC